# Patient Record
Sex: MALE | Race: WHITE | NOT HISPANIC OR LATINO | Employment: PART TIME | RURAL
[De-identification: names, ages, dates, MRNs, and addresses within clinical notes are randomized per-mention and may not be internally consistent; named-entity substitution may affect disease eponyms.]

---

## 2022-04-26 RX ORDER — TIZANIDINE 4 MG/1
TABLET ORAL
Qty: 90 TABLET | OUTPATIENT
Start: 2022-04-26

## 2022-04-26 RX ORDER — OMEPRAZOLE 20 MG/1
CAPSULE, DELAYED RELEASE ORAL
Qty: 90 CAPSULE | OUTPATIENT
Start: 2022-04-26

## 2022-04-26 RX ORDER — ESCITALOPRAM OXALATE 20 MG/1
TABLET ORAL
Qty: 90 TABLET | OUTPATIENT
Start: 2022-04-26

## 2022-04-26 NOTE — TELEPHONE ENCOUNTER
Called patient and lvm, we need to get him on the schedule. His last visit dedicated to his medications was on 03/17/21. This was a preventative exam so if he wanted to do a physical, he is due and can do that. Other than that, he has been seen 2x for acute back pain. Refusing medications and flagging it as contact provider first. Once we get him on the schedule with someone, we can send in his medication to hold him over to his appointment

## 2022-04-29 ENCOUNTER — TELEPHONE (OUTPATIENT)
Dept: FAMILY MEDICINE CLINIC | Facility: CLINIC | Age: 47
End: 2022-04-29

## 2022-04-29 RX ORDER — OMEPRAZOLE 20 MG/1
20 CAPSULE, DELAYED RELEASE ORAL
Qty: 30 CAPSULE | Refills: 0 | Status: SHIPPED | OUTPATIENT
Start: 2022-04-29 | End: 2022-07-15 | Stop reason: SDUPTHER

## 2022-04-29 RX ORDER — ESCITALOPRAM OXALATE 20 MG/1
20 TABLET ORAL DAILY
Qty: 30 TABLET | Refills: 0 | Status: SHIPPED | OUTPATIENT
Start: 2022-04-29 | End: 2022-07-15 | Stop reason: SDUPTHER

## 2022-04-29 RX ORDER — TIZANIDINE 4 MG/1
4 TABLET ORAL NIGHTLY PRN
COMMUNITY
End: 2022-04-29 | Stop reason: SDUPTHER

## 2022-04-29 RX ORDER — TIZANIDINE 4 MG/1
4 TABLET ORAL NIGHTLY
Qty: 30 TABLET | Refills: 0 | Status: SHIPPED | OUTPATIENT
Start: 2022-04-29 | End: 2022-07-27 | Stop reason: ALTCHOICE

## 2022-04-29 RX ORDER — ESCITALOPRAM OXALATE 20 MG/1
TABLET ORAL
Qty: 30 TABLET | Refills: 0 | Status: SHIPPED | OUTPATIENT
Start: 2022-04-29 | End: 2022-04-29 | Stop reason: SDUPTHER

## 2022-04-29 RX ORDER — OMEPRAZOLE 20 MG/1
CAPSULE, DELAYED RELEASE ORAL
Qty: 30 CAPSULE | Refills: 0 | Status: SHIPPED | OUTPATIENT
Start: 2022-04-29 | End: 2022-04-29 | Stop reason: SDUPTHER

## 2022-04-29 NOTE — TELEPHONE ENCOUNTER
Patient returned Jacque's call. He has been scheduled for 5/9/2022. He would like you to put the refills in to cover him until his appointment. He is out of medication.  See message below.

## 2022-05-17 ENCOUNTER — TELEPHONE (OUTPATIENT)
Dept: FAMILY MEDICINE CLINIC | Facility: CLINIC | Age: 47
End: 2022-05-17

## 2022-07-15 ENCOUNTER — TELEPHONE (OUTPATIENT)
Dept: FAMILY MEDICINE CLINIC | Facility: CLINIC | Age: 47
End: 2022-07-15

## 2022-07-15 RX ORDER — OMEPRAZOLE 20 MG/1
20 CAPSULE, DELAYED RELEASE ORAL
Qty: 30 CAPSULE | Refills: 0 | Status: SHIPPED | OUTPATIENT
Start: 2022-07-15 | End: 2022-07-27 | Stop reason: SDUPTHER

## 2022-07-15 RX ORDER — ESCITALOPRAM OXALATE 20 MG/1
20 TABLET ORAL DAILY
Qty: 30 TABLET | Refills: 0 | Status: SHIPPED | OUTPATIENT
Start: 2022-07-15 | End: 2022-07-27 | Stop reason: ALTCHOICE

## 2022-07-15 RX ORDER — ESCITALOPRAM OXALATE 20 MG/1
TABLET ORAL
Qty: 30 TABLET | Refills: 0 | OUTPATIENT
Start: 2022-07-15

## 2022-07-15 NOTE — TELEPHONE ENCOUNTER
Pt has been told he needs appt for refills and has no showed/canceled last appts. Will not send medicine in until seen in office.

## 2022-07-15 NOTE — TELEPHONE ENCOUNTER
Caller: Toribio Fishman    Relationship: Self    Best call back number: [phone]    Requested Prescriptions:   Requested Prescriptions     Pending Prescriptions Disp Refills   • omeprazole (priLOSEC) 20 MG capsule 30 capsule 0     Sig: Take 1 capsule by mouth Every Morning Before Breakfast.   • escitalopram (LEXAPRO) 20 MG tablet 30 tablet 0     Sig: Take 1 tablet by mouth Daily.        Pharmacy where request should be sent: [facility]  - [phone] [facility] [phone] [facility]     Additional details provided by patient: PATIENT HAS 1 DAY SUPPLY LEFT     Does the patient have less than a 3 day supply:  [x] Yes  [] No    Bernabe Butcher Rep   07/15/22 15:29 EDT

## 2022-07-27 ENCOUNTER — OFFICE VISIT (OUTPATIENT)
Dept: FAMILY MEDICINE CLINIC | Facility: CLINIC | Age: 47
End: 2022-07-27

## 2022-07-27 VITALS
TEMPERATURE: 96.7 F | OXYGEN SATURATION: 97 % | DIASTOLIC BLOOD PRESSURE: 100 MMHG | BODY MASS INDEX: 32.78 KG/M2 | HEIGHT: 72 IN | HEART RATE: 73 BPM | WEIGHT: 242 LBS | SYSTOLIC BLOOD PRESSURE: 130 MMHG

## 2022-07-27 DIAGNOSIS — Z13.29 THYROID DISORDER SCREENING: ICD-10-CM

## 2022-07-27 DIAGNOSIS — K21.9 GASTROESOPHAGEAL REFLUX DISEASE, UNSPECIFIED WHETHER ESOPHAGITIS PRESENT: Primary | ICD-10-CM

## 2022-07-27 DIAGNOSIS — R03.0 ELEVATED BLOOD PRESSURE READING WITHOUT DIAGNOSIS OF HYPERTENSION: ICD-10-CM

## 2022-07-27 DIAGNOSIS — F41.9 ANXIETY: ICD-10-CM

## 2022-07-27 DIAGNOSIS — Z13.220 LIPID SCREENING: ICD-10-CM

## 2022-07-27 DIAGNOSIS — K59.00 CONSTIPATION, UNSPECIFIED CONSTIPATION TYPE: ICD-10-CM

## 2022-07-27 DIAGNOSIS — E55.9 VITAMIN D DEFICIENCY: ICD-10-CM

## 2022-07-27 DIAGNOSIS — G89.29 CHRONIC BILATERAL LOW BACK PAIN WITHOUT SCIATICA: ICD-10-CM

## 2022-07-27 DIAGNOSIS — M54.50 CHRONIC BILATERAL LOW BACK PAIN WITHOUT SCIATICA: ICD-10-CM

## 2022-07-27 DIAGNOSIS — E53.8 LOW VITAMIN B12 LEVEL: ICD-10-CM

## 2022-07-27 PROCEDURE — 99214 OFFICE O/P EST MOD 30 MIN: CPT | Performed by: PHYSICIAN ASSISTANT

## 2022-07-27 RX ORDER — OMEPRAZOLE 20 MG/1
20 CAPSULE, DELAYED RELEASE ORAL
Qty: 90 CAPSULE | Refills: 1 | Status: SHIPPED | OUTPATIENT
Start: 2022-07-27 | End: 2023-03-06 | Stop reason: SDUPTHER

## 2022-07-27 RX ORDER — METHOCARBAMOL 500 MG/1
TABLET, FILM COATED ORAL
Qty: 30 TABLET | Refills: 0 | Status: SHIPPED | OUTPATIENT
Start: 2022-07-27 | End: 2022-09-21 | Stop reason: SDUPTHER

## 2022-07-27 RX ORDER — PAROXETINE HYDROCHLORIDE 20 MG/1
20 TABLET, FILM COATED ORAL EVERY MORNING
Qty: 30 TABLET | Refills: 0 | Status: SHIPPED | OUTPATIENT
Start: 2022-07-27 | End: 2022-09-21 | Stop reason: SDUPTHER

## 2022-07-27 NOTE — PROGRESS NOTES
"Chief Complaint  Med Refill (Medication refills ) and Constipation (Not having normal bowel movements wants to discuss getting a colonoscopy )    Subjective          Toribio Fishman presents to [facility]  Patient in today for medication recheck and refills. He would like to go back to [facility] for his anxiety- felt it worked better than the lexapro. Denies any SI/HI.  He states omeprazole continues to work for his reflux symptoms. He also states takes muscle relaxer prn at bedtime- has had some issues with lower back in past that have shown improvement.  He states has noticed more issues with constipation so interested in getting in with GI for consult. States his bp has been good when he has checked- but has not checked lately. Denies chest pain or shortness of breath.       Objective   Vital Signs:   /100 (BP Location: Left arm, Patient Position: Sitting, Cuff Size: Large Adult)   Pulse 73   Temp 96.7 °F (35.9 °C)   Ht 182.9 cm (72\")   Wt 110 kg (242 lb)   SpO2 97%   BMI 32.82 kg/m²     Body mass index is 32.82 kg/m².    Review of Systems    Past History:  Medical History: has a past medical history of Cardiomyopathy (HCC), Intermittent palpitations, and Obsessive compulsive disorder.   Surgical History: has a past surgical history that includes Cardiac catheterization (11/2005).   Family History: family history includes Coronary artery disease in an other family member; Diabetes in an other family member; Hypertension in his father.   Social History: reports that he has never smoked. He has never used smokeless tobacco. He reports current alcohol use of about 12.0 standard drinks of alcohol per week. He reports that he does not use drugs.      Current Outpatient Medications:   •  omeprazole (priLOSEC) 20 MG capsule, Take 1 capsule by mouth Every Morning Before Breakfast., Disp: 90 capsule, Rfl: 1  •  methocarbamol (Robaxin) 500 MG tablet, Take one pill at bedtime, as " needed, Disp: 30 tablet, Rfl: 0  •  PARoxetine (Paxil) 20 MG tablet, Take 1 tablet by mouth Every Morning., Disp: 30 tablet, Rfl: 0  Allergies: Patient has no known allergies.    Physical Exam  Constitutional:       Appearance: Normal appearance.   HENT:      Right Ear: Tympanic membrane normal.      Left Ear: Tympanic membrane normal.      Mouth/Throat:      Pharynx: Oropharynx is clear.   Eyes:      Conjunctiva/sclera: Conjunctivae normal.      Pupils: Pupils are equal, round, and reactive to light.   Cardiovascular:      Rate and Rhythm: Normal rate and regular rhythm.      Heart sounds: Normal heart sounds.   Pulmonary:      Effort: Pulmonary effort is normal.      Breath sounds: Normal breath sounds.   Abdominal:      Palpations: Abdomen is soft.      Tenderness: There is no abdominal tenderness.   Neurological:      Mental Status: He is oriented to person, place, and time.   Psychiatric:         Mood and Affect: Mood normal.         Behavior: Behavior normal.             Assessment and Plan   Diagnoses and all orders for this visit:    1. Gastroesophageal reflux disease, unspecified whether esophagitis present (Primary)  -     CBC & Differential; Future  -     CBC & Differential  Refilled omeprazole.   2. Anxiety  He is going to wean down off the lexapro over next few weeks and then will  switch over to the paxil. Will recheck in office after on paxil x 3 weeks, prior as needed.   3. Chronic bilateral low back pain without sciatica  Muscle relaxer to use at bedtime, as needed. Discussed possible side effects.   4. Low vitamin B12 level  -     Vitamin B12; Future  -     Vitamin B12  Will recheck b12 level.   5. Vitamin D deficiency  -     Vitamin D 25 Hydroxy; Future  -     Vitamin D 25 Hydroxy  Will recheck vit d with labs.   6. Lipid screening  -     Comprehensive Metabolic Panel; Future  -     Lipid Panel; Future  -     Comprehensive Metabolic Panel  -     Lipid Panel    7. Thyroid disorder screening  -      TSH; Future  -     TSH  TSH with labs.   8. Constipation, unspecified constipation type  -     Ambulatory Referral to Gastroenterology  Will set up with GI. Encouraged healthy diet and good hydration.   9. Elevated blood pressure reading without diagnosis of hypertension  Patient to monitor bp over next 2 weeks and bring in bp readings, prior as needed if staying elevated.   Other orders  -     PARoxetine (Paxil) 20 MG tablet; Take 1 tablet by mouth Every Morning.  Dispense: 30 tablet; Refill: 0  -     methocarbamol (Robaxin) 500 MG tablet; Take one pill at bedtime, as needed  Dispense: 30 tablet; Refill: 0  -     omeprazole (priLOSEC) 20 MG capsule; Take 1 capsule by mouth Every Morning Before Breakfast.  Dispense: 90 capsule; Refill: 1            Follow Up   No follow-ups on file.  Patient was given instructions and counseling regarding his condition or for health maintenance advice. Please see specific information pulled into the AVS if appropriate.     Rohini Guzman PA-C

## 2022-07-28 LAB
25(OH)D3+25(OH)D2 SERPL-MCNC: 31.3 NG/ML (ref 30–100)
ALBUMIN SERPL-MCNC: 4.3 G/DL (ref 4–5)
ALBUMIN/GLOB SERPL: 1.5 {RATIO} (ref 1.2–2.2)
ALP SERPL-CCNC: 60 IU/L (ref 44–121)
ALT SERPL-CCNC: 25 IU/L (ref 0–44)
AST SERPL-CCNC: 19 IU/L (ref 0–40)
BASOPHILS # BLD AUTO: 0 X10E3/UL (ref 0–0.2)
BASOPHILS NFR BLD AUTO: 1 %
BILIRUB SERPL-MCNC: 0.4 MG/DL (ref 0–1.2)
BUN SERPL-MCNC: 13 MG/DL (ref 6–24)
BUN/CREAT SERPL: 13 (ref 9–20)
CALCIUM SERPL-MCNC: 9.8 MG/DL (ref 8.7–10.2)
CHLORIDE SERPL-SCNC: 103 MMOL/L (ref 96–106)
CHOLEST SERPL-MCNC: 226 MG/DL (ref 100–199)
CO2 SERPL-SCNC: 25 MMOL/L (ref 20–29)
CREAT SERPL-MCNC: 1.03 MG/DL (ref 0.76–1.27)
EGFRCR SERPLBLD CKD-EPI 2021: 91 ML/MIN/1.73
EOSINOPHIL # BLD AUTO: 0.1 X10E3/UL (ref 0–0.4)
EOSINOPHIL NFR BLD AUTO: 3 %
ERYTHROCYTE [DISTWIDTH] IN BLOOD BY AUTOMATED COUNT: 13.5 % (ref 11.6–15.4)
GLOBULIN SER CALC-MCNC: 2.8 G/DL (ref 1.5–4.5)
GLUCOSE SERPL-MCNC: 98 MG/DL (ref 65–99)
HCT VFR BLD AUTO: 43.4 % (ref 37.5–51)
HDLC SERPL-MCNC: 64 MG/DL
HGB BLD-MCNC: 14.9 G/DL (ref 13–17.7)
IMM GRANULOCYTES # BLD AUTO: 0 X10E3/UL (ref 0–0.1)
IMM GRANULOCYTES NFR BLD AUTO: 1 %
LDLC SERPL CALC-MCNC: 121 MG/DL (ref 0–99)
LYMPHOCYTES # BLD AUTO: 0.9 X10E3/UL (ref 0.7–3.1)
LYMPHOCYTES NFR BLD AUTO: 22 %
MCH RBC QN AUTO: 32.4 PG (ref 26.6–33)
MCHC RBC AUTO-ENTMCNC: 34.3 G/DL (ref 31.5–35.7)
MCV RBC AUTO: 94 FL (ref 79–97)
MONOCYTES # BLD AUTO: 0.4 X10E3/UL (ref 0.1–0.9)
MONOCYTES NFR BLD AUTO: 9 %
NEUTROPHILS # BLD AUTO: 2.7 X10E3/UL (ref 1.4–7)
NEUTROPHILS NFR BLD AUTO: 64 %
PLATELET # BLD AUTO: 266 X10E3/UL (ref 150–450)
POTASSIUM SERPL-SCNC: 4.7 MMOL/L (ref 3.5–5.2)
PROT SERPL-MCNC: 7.1 G/DL (ref 6–8.5)
RBC # BLD AUTO: 4.6 X10E6/UL (ref 4.14–5.8)
SODIUM SERPL-SCNC: 143 MMOL/L (ref 134–144)
TRIGL SERPL-MCNC: 235 MG/DL (ref 0–149)
TSH SERPL DL<=0.005 MIU/L-ACNC: 1.76 UIU/ML (ref 0.45–4.5)
VIT B12 SERPL-MCNC: 293 PG/ML (ref 232–1245)
VLDLC SERPL CALC-MCNC: 41 MG/DL (ref 5–40)
WBC # BLD AUTO: 4.2 X10E3/UL (ref 3.4–10.8)

## 2022-07-30 DIAGNOSIS — E78.2 MIXED HYPERLIPIDEMIA: Primary | ICD-10-CM

## 2022-07-30 DIAGNOSIS — E53.8 LOW VITAMIN B12 LEVEL: ICD-10-CM

## 2022-08-01 ENCOUNTER — PATIENT MESSAGE (OUTPATIENT)
Dept: FAMILY MEDICINE CLINIC | Facility: CLINIC | Age: 47
End: 2022-08-01

## 2022-08-03 ENCOUNTER — TELEPHONE (OUTPATIENT)
Dept: FAMILY MEDICINE CLINIC | Facility: CLINIC | Age: 47
End: 2022-08-03

## 2022-08-15 ENCOUNTER — PATIENT MESSAGE (OUTPATIENT)
Dept: FAMILY MEDICINE CLINIC | Facility: CLINIC | Age: 47
End: 2022-08-15

## 2022-08-17 NOTE — TELEPHONE ENCOUNTER
DAYAMI called me back and was able to get patient an appointment for the morning. Tried calling patient, will try again later

## 2022-08-17 NOTE — TELEPHONE ENCOUNTER
From: Toribio Fishman  To: Rohini Guzman PA-C  Sent: 8/15/2022 5:10 PM EDT  Subject: Colonoscopy    I still haven’t received a call regarding a Colonoscopy exam. It’s been three weeks and nobody has got in touch with me. I’m still having stomach pains and wiping blood after going. We talked about seeing Dr Apple with [facility] but Im ready to see anyone as soon as possible. Thank you.

## 2022-09-21 RX ORDER — METHOCARBAMOL 500 MG/1
TABLET, FILM COATED ORAL
Qty: 30 TABLET | Refills: 0 | Status: SHIPPED | OUTPATIENT
Start: 2022-09-21

## 2022-09-21 RX ORDER — PAROXETINE HYDROCHLORIDE 20 MG/1
20 TABLET, FILM COATED ORAL EVERY MORNING
Qty: 30 TABLET | Refills: 0 | Status: SHIPPED | OUTPATIENT
Start: 2022-09-21 | End: 2022-10-27 | Stop reason: DRUGHIGH

## 2022-10-27 ENCOUNTER — OFFICE VISIT (OUTPATIENT)
Dept: FAMILY MEDICINE CLINIC | Facility: CLINIC | Age: 47
End: 2022-10-27

## 2022-10-27 VITALS
WEIGHT: 245 LBS | OXYGEN SATURATION: 98 % | HEART RATE: 73 BPM | SYSTOLIC BLOOD PRESSURE: 128 MMHG | HEIGHT: 72 IN | BODY MASS INDEX: 33.18 KG/M2 | DIASTOLIC BLOOD PRESSURE: 68 MMHG

## 2022-10-27 DIAGNOSIS — F41.9 ANXIETY: ICD-10-CM

## 2022-10-27 DIAGNOSIS — Z00.00 ROUTINE PHYSICAL EXAMINATION: Primary | ICD-10-CM

## 2022-10-27 DIAGNOSIS — E78.2 MIXED HYPERLIPIDEMIA: ICD-10-CM

## 2022-10-27 DIAGNOSIS — K21.9 GASTROESOPHAGEAL REFLUX DISEASE, UNSPECIFIED WHETHER ESOPHAGITIS PRESENT: ICD-10-CM

## 2022-10-27 PROCEDURE — 99396 PREV VISIT EST AGE 40-64: CPT | Performed by: PHYSICIAN ASSISTANT

## 2022-10-27 PROCEDURE — 36415 COLL VENOUS BLD VENIPUNCTURE: CPT | Performed by: PHYSICIAN ASSISTANT

## 2022-10-27 RX ORDER — PAROXETINE 30 MG/1
TABLET, FILM COATED ORAL
Qty: 90 TABLET | Refills: 0 | Status: SHIPPED | OUTPATIENT
Start: 2022-10-27 | End: 2023-02-15

## 2022-10-28 LAB
ALBUMIN SERPL-MCNC: 4.4 G/DL (ref 4–5)
ALBUMIN/GLOB SERPL: 1.4 {RATIO} (ref 1.2–2.2)
ALP SERPL-CCNC: 68 IU/L (ref 44–121)
ALT SERPL-CCNC: 25 IU/L (ref 0–44)
AST SERPL-CCNC: 19 IU/L (ref 0–40)
BILIRUB SERPL-MCNC: 0.3 MG/DL (ref 0–1.2)
BUN SERPL-MCNC: 15 MG/DL (ref 6–24)
BUN/CREAT SERPL: 13 (ref 9–20)
CALCIUM SERPL-MCNC: 9.6 MG/DL (ref 8.7–10.2)
CHLORIDE SERPL-SCNC: 104 MMOL/L (ref 96–106)
CHOLEST SERPL-MCNC: 208 MG/DL (ref 100–199)
CO2 SERPL-SCNC: 25 MMOL/L (ref 20–29)
CREAT SERPL-MCNC: 1.12 MG/DL (ref 0.76–1.27)
EGFRCR SERPLBLD CKD-EPI 2021: 83 ML/MIN/1.73
GLOBULIN SER CALC-MCNC: 3.1 G/DL (ref 1.5–4.5)
GLUCOSE SERPL-MCNC: 105 MG/DL (ref 70–99)
HDLC SERPL-MCNC: 64 MG/DL
LDLC SERPL CALC-MCNC: 117 MG/DL (ref 0–99)
POTASSIUM SERPL-SCNC: 4.7 MMOL/L (ref 3.5–5.2)
PROT SERPL-MCNC: 7.5 G/DL (ref 6–8.5)
SODIUM SERPL-SCNC: 141 MMOL/L (ref 134–144)
TRIGL SERPL-MCNC: 155 MG/DL (ref 0–149)
VIT B12 SERPL-MCNC: 251 PG/ML (ref 232–1245)
VLDLC SERPL CALC-MCNC: 27 MG/DL (ref 5–40)

## 2022-10-28 NOTE — PROGRESS NOTES
"Chief Complaint  Annual Exam (Med refills and BW )    Subjective          Toribio Fishman presents to [facility]  History of Present Illness  Patient in today for routine physical along with medication recheck and refills.  He states the Paxil seems to be working for his anxiety but he is interested in possibly increasing it just a bit ast still having some symptoms.  He denies any depression symptoms. Denies SI/HI.  States omeprazole continues to work well for reflux symptoms. He is here fasting for labs.   Anxiety  Presents for follow-up visit. Symptoms include nervous/anxious behavior. Patient reports no chest pain, depressed mood, dizziness or nausea.       Heartburn  He complains of heartburn. He reports no abdominal pain, no chest pain, no hoarse voice, no nausea, no sore throat or no wheezing. Pertinent negatives include no fatigue.       Objective   Vital Signs:   /68 (BP Location: Left arm, Patient Position: Sitting, Cuff Size: Large Adult)   Pulse 73   Ht 182.9 cm (72\")   Wt 111 kg (245 lb)   SpO2 98%   BMI 33.23 kg/m²     Body mass index is 33.23 kg/m².    Review of Systems   Constitutional: Negative for fatigue.   HENT: Negative for congestion, hoarse voice and sore throat.    Respiratory: Negative for wheezing.    Cardiovascular: Negative for chest pain.   Gastrointestinal: Positive for GERD. Negative for abdominal pain, diarrhea, nausea and vomiting.   Neurological: Negative for dizziness and headache.   Psychiatric/Behavioral: Negative for depressed mood. The patient is nervous/anxious.        Past History:  Medical History: has a past medical history of Cardiomyopathy (HCC), Intermittent palpitations, and Obsessive compulsive disorder.   Surgical History: has a past surgical history that includes Cardiac catheterization (11/2005).   Family History: family history includes Coronary artery disease in an other family member; Diabetes in an other family member; " Hypertension in his father.   Social History: reports that he has never smoked. He has never used smokeless tobacco. He reports current alcohol use of about 12.0 standard drinks per week. He reports that he does not use drugs.      Current Outpatient Medications:   •  methocarbamol (Robaxin) 500 MG tablet, Take one pill at bedtime, as needed, Disp: 30 tablet, Rfl: 0  •  omeprazole (priLOSEC) 20 MG capsule, Take 1 capsule by mouth Every Morning Before Breakfast., Disp: 90 capsule, Rfl: 1  •  PARoxetine (Paxil) 30 MG tablet, Take one tablet by mouth  once a day., Disp: 90 tablet, Rfl: 0  Allergies: Patient has no known allergies.    Physical Exam  Constitutional:       Appearance: Normal appearance.   HENT:      Right Ear: Tympanic membrane normal.      Left Ear: Tympanic membrane normal.      Mouth/Throat:      Pharynx: Oropharynx is clear.   Eyes:      Conjunctiva/sclera: Conjunctivae normal.      Pupils: Pupils are equal, round, and reactive to light.   Cardiovascular:      Rate and Rhythm: Normal rate and regular rhythm.      Heart sounds: Normal heart sounds.   Pulmonary:      Effort: Pulmonary effort is normal.      Breath sounds: Normal breath sounds.   Abdominal:      Palpations: Abdomen is soft.      Tenderness: There is no abdominal tenderness.   Neurological:      Mental Status: He is oriented to person, place, and time.   Psychiatric:         Mood and Affect: Mood normal.         Behavior: Behavior normal.             Assessment and Plan   Diagnoses and all orders for this visit:    1. Routine physical examination (Primary)  Encouraged healthy diet and exercise.  2. Mixed hyperlipidemia  -     Lipid Panel  -     Comprehensive Metabolic Panel  We will check fasting lipid panel.  3. Gastroesophageal reflux disease, unspecified whether esophagitis present  Continue on omeprazole as is helping his symptoms at this time.  4. Anxiety  Will increase the dosage of Paxil to 30 mg once a day and recheck in 6 weeks,  prior as needed.  Other orders  -     Vitamin B12  -     PARoxetine (Paxil) 30 MG tablet; Take one tablet by mouth  once a day.  Dispense: 90 tablet; Refill: 0            Follow Up   No follow-ups on file.  Patient was given instructions and counseling regarding his condition or for health maintenance advice. Please see specific information pulled into the AVS if appropriate.     Rohini Guzman PA-C

## 2022-10-31 ENCOUNTER — TELEPHONE (OUTPATIENT)
Dept: FAMILY MEDICINE CLINIC | Facility: CLINIC | Age: 47
End: 2022-10-31

## 2022-10-31 DIAGNOSIS — E53.8 LOW SERUM VITAMIN B12: ICD-10-CM

## 2022-10-31 DIAGNOSIS — E78.2 MIXED HYPERLIPIDEMIA: Primary | ICD-10-CM

## 2022-10-31 NOTE — TELEPHONE ENCOUNTER
Caller: Toribio Fishman    Relationship to patient: Self    Best call back number: [phone]    Patient is needing: PATIENT RECEIVED A CALL ABOUT TEST RESULTS AND IS CALLING BACK TO FOLLOW UP.

## 2022-11-11 ENCOUNTER — TELEPHONE (OUTPATIENT)
Dept: FAMILY MEDICINE CLINIC | Facility: CLINIC | Age: 47
End: 2022-11-11

## 2022-11-11 NOTE — TELEPHONE ENCOUNTER
Caller: JOSE MANUEL ANTUNEZ BILLING    Relationship: Other    Best call back number: [phone]    THEY NEED THE DIAGNOSIS CODE FOR THE VITAMIN B12. PLEASE CALL AND ADVISE

## 2023-02-15 RX ORDER — PAROXETINE 30 MG/1
TABLET, FILM COATED ORAL
Qty: 30 TABLET | Refills: 0 | Status: SHIPPED | OUTPATIENT
Start: 2023-02-15 | End: 2023-03-06 | Stop reason: SDUPTHER

## 2023-03-06 ENCOUNTER — OFFICE VISIT (OUTPATIENT)
Dept: FAMILY MEDICINE CLINIC | Facility: CLINIC | Age: 48
End: 2023-03-06
Payer: COMMERCIAL

## 2023-03-06 VITALS
BODY MASS INDEX: 33.86 KG/M2 | DIASTOLIC BLOOD PRESSURE: 108 MMHG | WEIGHT: 250 LBS | HEIGHT: 72 IN | SYSTOLIC BLOOD PRESSURE: 140 MMHG | OXYGEN SATURATION: 99 % | HEART RATE: 88 BPM

## 2023-03-06 DIAGNOSIS — J30.89 NON-SEASONAL ALLERGIC RHINITIS DUE TO OTHER ALLERGIC TRIGGER: ICD-10-CM

## 2023-03-06 DIAGNOSIS — K62.5 BRBPR (BRIGHT RED BLOOD PER RECTUM): Primary | ICD-10-CM

## 2023-03-06 DIAGNOSIS — K21.00 GASTROESOPHAGEAL REFLUX DISEASE WITH ESOPHAGITIS WITHOUT HEMORRHAGE: ICD-10-CM

## 2023-03-06 DIAGNOSIS — F33.41 RECURRENT MAJOR DEPRESSIVE DISORDER, IN PARTIAL REMISSION: ICD-10-CM

## 2023-03-06 DIAGNOSIS — G89.29 CHRONIC LOW BACK PAIN WITHOUT SCIATICA, UNSPECIFIED BACK PAIN LATERALITY: ICD-10-CM

## 2023-03-06 DIAGNOSIS — M54.50 CHRONIC LOW BACK PAIN WITHOUT SCIATICA, UNSPECIFIED BACK PAIN LATERALITY: ICD-10-CM

## 2023-03-06 DIAGNOSIS — N52.9 ERECTILE DYSFUNCTION, UNSPECIFIED ERECTILE DYSFUNCTION TYPE: ICD-10-CM

## 2023-03-06 PROCEDURE — 99214 OFFICE O/P EST MOD 30 MIN: CPT | Performed by: FAMILY MEDICINE

## 2023-03-06 RX ORDER — TADALAFIL 10 MG/1
10 TABLET ORAL DAILY PRN
Qty: 30 TABLET | Refills: 3 | Status: SHIPPED | OUTPATIENT
Start: 2023-03-06

## 2023-03-06 RX ORDER — TIZANIDINE HYDROCHLORIDE 4 MG/1
4 CAPSULE, GELATIN COATED ORAL 3 TIMES DAILY
Qty: 30 CAPSULE | Refills: 5 | Status: SHIPPED | OUTPATIENT
Start: 2023-03-06

## 2023-03-06 RX ORDER — PAROXETINE 30 MG/1
TABLET, FILM COATED ORAL
Qty: 30 TABLET | Refills: 0 | Status: SHIPPED | OUTPATIENT
Start: 2023-03-06

## 2023-03-06 RX ORDER — MOMETASONE FUROATE 50 UG/1
2 SPRAY, METERED NASAL DAILY
Qty: 3 EACH | Refills: 3 | Status: SHIPPED | OUTPATIENT
Start: 2023-03-06

## 2023-03-06 RX ORDER — OMEPRAZOLE 20 MG/1
20 CAPSULE, DELAYED RELEASE ORAL
Qty: 90 CAPSULE | Refills: 1 | Status: SHIPPED | OUTPATIENT
Start: 2023-03-06

## 2023-03-06 NOTE — PROGRESS NOTES
Office Note     Name: Toribio Fishman    : 1975     MRN: [id number]     Chief Complaint  Abdominal Pain and Rectal Bleeding    Subjective     History of Present Illness:  Toribio Fishman is a 47 y.o. male who presents today for rectal bleeding and abdominal pain.  Has appointment with Dr. Apple.  Its bright red blood per rectum explained to him only internal hemorrhoids and external hemorrhoids diverticulosis and colitis really he hurts right of the bellybutton.  Having nothing to do with omeprazole  He has Cialis and Viagra because nasal stuffiness, quit using them, but would like to try them again based on allergic rhinitis being treated Flonase or Nasonex.  He takes Zyrtec when needed.  He asked that I refill his tizanidine 4 mg 3 times daily.    Need to refill his Paxil 30 mg to prevent anxiety I really rechecked his pressure and got 150/90 left arm    Review of Systems:   Review of Systems    Past Medical History:   Past Medical History:   Diagnosis Date   • Cardiomyopathy (HCC)     MILD      • Intermittent palpitations    • Obsessive compulsive disorder        Past Surgical History:   Past Surgical History:   Procedure Laterality Date   • CARDIAC CATHETERIZATION  2005       Family History:   Family History   Problem Relation Age of Onset   • Hypertension Father    • Coronary artery disease Other    • Diabetes Other        Social History:   Social History     Socioeconomic History   • Marital status:    Tobacco Use   • Smoking status: Never   • Smokeless tobacco: Never   Vaping Use   • Vaping Use: Never used   Substance and Sexual Activity   • Alcohol use: Yes     Alcohol/week: 12.0 standard drinks     Types: 12 Cans of beer per week   • Drug use: Never   • Sexual activity: Defer       Immunizations:   Immunization History   Administered Date(s) Administered   • COVID-19 (PFIZER) PURPLE CAP 2021, 2021   • Influenza, Unspecified 2019        Medications:     Current  "Outpatient Medications:   •  omeprazole (priLOSEC) 20 MG capsule, Take 1 capsule by mouth Every Morning Before Breakfast., Disp: 90 capsule, Rfl: 1  •  PARoxetine (PAXIL) 30 MG tablet, TAKE ONE TABLET BY MOUTH DAILY, Disp: 30 tablet, Rfl: 0  •  methocarbamol (Robaxin) 500 MG tablet, Take one pill at bedtime, as needed, Disp: 30 tablet, Rfl: 0  •  mometasone (Nasonex) 50 MCG/ACT nasal spray, 2 sprays into the nostril(s) as directed by provider Daily., Disp: 3 each, Rfl: 3  •  tadalafil (Cialis) 10 MG tablet, Take 1 tablet by mouth Daily As Needed for Erectile Dysfunction., Disp: 30 tablet, Rfl: 3  •  TiZANidine (ZANAFLEX) 4 MG capsule, Take 1 capsule by mouth 3 (Three) Times a Day., Disp: 30 capsule, Rfl: 5    Allergies:   No Known Allergies    Objective     Vital Signs  BP (!) 140/108 (BP Location: Left arm, Patient Position: Sitting, Cuff Size: Large Adult)   Pulse 88   Ht 182.9 cm (72\")   Wt 113 kg (250 lb)   SpO2 99%   BMI 33.91 kg/m²   Estimated body mass index is 33.91 kg/m² as calculated from the following:    Height as of this encounter: 182.9 cm (72\").    Weight as of this encounter: 113 kg (250 lb).          Physical Exam  Vitals and nursing note reviewed.   Constitutional:       Appearance: Normal appearance. He is obese.   HENT:      Head: Normocephalic.      Right Ear: External ear normal.      Left Ear: External ear normal.      Nose: Nose normal.   Eyes:      Pupils: Pupils are equal, round, and reactive to light.   Neck:      Vascular: No carotid bruit.   Cardiovascular:      Rate and Rhythm: Normal rate and regular rhythm.      Pulses: Normal pulses.      Heart sounds: Normal heart sounds.   Pulmonary:      Effort: Pulmonary effort is normal.      Breath sounds: Normal breath sounds.   Abdominal:          Comments: Where pain is.   Without rebound or guarding.   Musculoskeletal:      Cervical back: Normal range of motion and neck supple.   Skin:     General: Skin is warm and dry.   Neurological: "      Mental Status: He is alert.   Psychiatric:         Mood and Affect: Mood normal.          Procedures     Assessment and Plan     1. BRBPR (bright red blood per rectum)  To have a scope by Dr. Apple according to this getting better    2. Gastroesophageal reflux disease with esophagitis without hemorrhage  Controlled  - omeprazole (priLOSEC) 20 MG capsule; Take 1 capsule by mouth Every Morning Before Breakfast.  Dispense: 90 capsule; Refill: 1    3. Non-seasonal allergic rhinitis due to other allergic trigger  We will put on the spray  - mometasone (Nasonex) 50 MCG/ACT nasal spray; 2 sprays into the nostril(s) as directed by provider Daily.  Dispense: 3 each; Refill: 3    4. Recurrent major depressive disorder, in partial remission (HCC)  Controlled  - PARoxetine (PAXIL) 30 MG tablet; TAKE ONE TABLET BY MOUTH DAILY  Dispense: 30 tablet; Refill: 0    5. Erectile dysfunction, unspecified erectile dysfunction type  Will start 10 mg, #30, and refills 3  - tadalafil (Cialis) 10 MG tablet; Take 1 tablet by mouth Daily As Needed for Erectile Dysfunction.  Dispense: 30 tablet; Refill: 3    6. Chronic low back pain without sciatica, unspecified back pain laterality  To have his tizanidine 4 mg #30 with refills x5       Follow Up  No follow-ups on file.    Daniele GEE PC [facility]  [address] [place] [id number] [phone]

## 2023-05-10 DIAGNOSIS — F33.41 RECURRENT MAJOR DEPRESSIVE DISORDER, IN PARTIAL REMISSION: ICD-10-CM

## 2023-05-10 RX ORDER — PAROXETINE 30 MG/1
TABLET, FILM COATED ORAL
Qty: 30 TABLET | Refills: 0 | Status: SHIPPED | OUTPATIENT
Start: 2023-05-10

## 2023-06-05 DIAGNOSIS — F33.41 RECURRENT MAJOR DEPRESSIVE DISORDER, IN PARTIAL REMISSION: ICD-10-CM

## 2023-06-05 RX ORDER — PAROXETINE 30 MG/1
TABLET, FILM COATED ORAL
Qty: 30 TABLET | Refills: 0 | Status: SHIPPED | OUTPATIENT
Start: 2023-06-05

## 2023-08-01 DIAGNOSIS — K21.00 GASTROESOPHAGEAL REFLUX DISEASE WITH ESOPHAGITIS WITHOUT HEMORRHAGE: ICD-10-CM

## 2023-08-02 DIAGNOSIS — F33.41 RECURRENT MAJOR DEPRESSIVE DISORDER, IN PARTIAL REMISSION: ICD-10-CM

## 2023-08-03 RX ORDER — OMEPRAZOLE 20 MG/1
CAPSULE, DELAYED RELEASE ORAL
Qty: 90 CAPSULE | Refills: 1 | Status: SHIPPED | OUTPATIENT
Start: 2023-08-03

## 2023-08-03 RX ORDER — PAROXETINE 30 MG/1
TABLET, FILM COATED ORAL
Qty: 30 TABLET | Refills: 1 | Status: SHIPPED | OUTPATIENT
Start: 2023-08-03

## 2023-08-07 DIAGNOSIS — F33.41 RECURRENT MAJOR DEPRESSIVE DISORDER, IN PARTIAL REMISSION: ICD-10-CM

## 2023-08-07 DIAGNOSIS — K21.00 GASTROESOPHAGEAL REFLUX DISEASE WITH ESOPHAGITIS WITHOUT HEMORRHAGE: ICD-10-CM

## 2023-08-08 RX ORDER — PAROXETINE 30 MG/1
TABLET, FILM COATED ORAL
Qty: 30 TABLET | Refills: 1 | OUTPATIENT
Start: 2023-08-08

## 2023-08-08 RX ORDER — OMEPRAZOLE 20 MG/1
CAPSULE, DELAYED RELEASE ORAL
Qty: 90 CAPSULE | Refills: 1 | OUTPATIENT
Start: 2023-08-08

## 2023-09-30 RX ORDER — TIZANIDINE HYDROCHLORIDE 4 MG/1
CAPSULE, GELATIN COATED ORAL
Qty: 30 CAPSULE | Refills: 3 | Status: SHIPPED | OUTPATIENT
Start: 2023-09-30

## 2023-10-04 ENCOUNTER — OFFICE VISIT (OUTPATIENT)
Dept: FAMILY MEDICINE CLINIC | Facility: CLINIC | Age: 48
End: 2023-10-04
Payer: COMMERCIAL

## 2023-10-04 VITALS
SYSTOLIC BLOOD PRESSURE: 120 MMHG | WEIGHT: 250 LBS | HEART RATE: 79 BPM | HEIGHT: 72 IN | BODY MASS INDEX: 33.86 KG/M2 | OXYGEN SATURATION: 99 % | DIASTOLIC BLOOD PRESSURE: 84 MMHG

## 2023-10-04 DIAGNOSIS — F33.41 RECURRENT MAJOR DEPRESSIVE DISORDER, IN PARTIAL REMISSION: ICD-10-CM

## 2023-10-04 DIAGNOSIS — K21.00 GASTROESOPHAGEAL REFLUX DISEASE WITH ESOPHAGITIS WITHOUT HEMORRHAGE: ICD-10-CM

## 2023-10-04 DIAGNOSIS — N52.9 ERECTILE DYSFUNCTION, UNSPECIFIED ERECTILE DYSFUNCTION TYPE: ICD-10-CM

## 2023-10-04 DIAGNOSIS — K42.9 PERIUMBILICAL HERNIA: Primary | ICD-10-CM

## 2023-10-04 PROBLEM — F32.5 MAJOR DEPRESSIVE DISORDER WITH SINGLE EPISODE, IN FULL REMISSION: Status: ACTIVE | Noted: 2023-10-04

## 2023-10-04 PROCEDURE — 99214 OFFICE O/P EST MOD 30 MIN: CPT | Performed by: FAMILY MEDICINE

## 2023-10-04 RX ORDER — PAROXETINE 30 MG/1
30 TABLET, FILM COATED ORAL DAILY
Qty: 90 TABLET | Refills: 3 | Status: SHIPPED | OUTPATIENT
Start: 2023-10-04

## 2023-10-04 RX ORDER — OMEPRAZOLE 20 MG/1
40 CAPSULE, DELAYED RELEASE ORAL DAILY
Qty: 90 CAPSULE | Refills: 3 | Status: SHIPPED | OUTPATIENT
Start: 2023-10-04

## 2023-10-04 RX ORDER — TIZANIDINE HYDROCHLORIDE 4 MG/1
4 CAPSULE, GELATIN COATED ORAL 3 TIMES DAILY
Qty: 90 CAPSULE | Refills: 3 | Status: SHIPPED | OUTPATIENT
Start: 2023-10-04

## 2023-10-04 RX ORDER — TADALAFIL 10 MG/1
10 TABLET ORAL DAILY PRN
Qty: 30 TABLET | Refills: 5 | Status: SHIPPED | OUTPATIENT
Start: 2023-10-04

## 2023-10-04 NOTE — PROGRESS NOTES
Office Note     Name: Toribio Fishman    : 1975     MRN: [id number]     Chief Complaint  Mass (Around belly button. )    Subjective     History of Present Illness:  Toribio Fishman is a 48 y.o. 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  Mass around his bellybutton    Doing well with Paxil    Doing well with the omeprazole he 20 mg increased him to 40 mg ARN    Takes tizanidine sleep    Review of Systems:   Review of Systems    Past Medical History:   Past Medical History:   Diagnosis Date    Cardiomyopathy     MILD       Intermittent palpitations     Obsessive compulsive disorder        Past Surgical History:   Past Surgical History:   Procedure Laterality Date    CARDIAC CATHETERIZATION  2005       Family History:   Family History   Problem Relation Age of Onset    Hypertension Father     Coronary artery disease Other     Diabetes Other        Social History:   Social History     Socioeconomic History    Marital status:    Tobacco Use    Smoking status: Never    Smokeless tobacco: Never   Vaping Use    Vaping Use: Never used   Substance and Sexual Activity    Alcohol use: Yes     Alcohol/week: 12.0 standard drinks     Types: 12 Cans of beer per week    Drug use: Never    Sexual activity: Defer       Immunizations:   Immunization History   Administered Date(s) Administered    COVID-19 (PFIZER) Purple Cap Monovalent 2021, 2021    Influenza, Unspecified 2019        Medications:     Current Outpatient Medications:     mometasone (Nasonex) 50 MCG/ACT nasal spray, 2 sprays into the nostril(s) as directed by provider Daily., Disp: 3 each, Rfl: 3    omeprazole (priLOSEC) 20 MG capsule, Take 2 capsules by mouth Daily., Disp: 90 capsule, Rfl: 3    PARoxetine (PAXIL) 30 MG tablet, Take 1 tablet by mouth Daily., Disp: 90 tablet, Rfl: 3    tadalafil (Cialis) 10 MG tablet, Take 1 tablet by mouth Daily As Needed  "for Erectile Dysfunction., Disp: 30 tablet, Rfl: 5    TiZANidine (ZANAFLEX) 4 MG capsule, Take 1 capsule by mouth 3 (Three) Times a Day., Disp: 90 capsule, Rfl: 3    Allergies:   No Known Allergies    Objective     Vital Signs  /84   Pulse 79   Ht 182.9 cm (72.01\")   Wt 113 kg (250 lb)   SpO2 99%   BMI 33.90 kg/m²   Estimated body mass index is 33.9 kg/m² as calculated from the following:    Height as of this encounter: 182.9 cm (72.01\").    Weight as of this encounter: 113 kg (250 lb).          Physical Exam  Vitals and nursing note reviewed.   Constitutional:       Appearance: Normal appearance. He is obese.   HENT:      Head: Normocephalic.      Right Ear: External ear normal.      Left Ear: External ear normal.      Nose: Nose normal.   Eyes:      Pupils: Pupils are equal, round, and reactive to light.   Abdominal:          Comments: 3 inch mass standing up, he laid down it went away   Musculoskeletal:      Cervical back: Normal range of motion and neck supple.   Skin:     General: Skin is warm and dry.   Neurological:      Mental Status: He is alert.   Psychiatric:         Mood and Affect: Mood normal.         Behavior: Behavior normal.        Procedures     Assessment and Plan     1. Gastroesophageal reflux disease with esophagitis without hemorrhage  Controlled  - omeprazole (priLOSEC) 20 MG capsule; Take 2 capsules by mouth Daily.  Dispense: 90 capsule; Refill: 3    2. Recurrent major depressive disorder, in partial remission  Well-controlled  - PARoxetine (PAXIL) 30 MG tablet; Take 1 tablet by mouth Daily.  Dispense: 90 tablet; Refill: 3    3. Erectile dysfunction, unspecified erectile dysfunction type    - tadalafil (Cialis) 10 MG tablet; Take 1 tablet by mouth Daily As Needed for Erectile Dysfunction.  Dispense: 30 tablet; Refill: 5    4. Periumbilical hernia  Getting what Dr. Briones       Follow Up  Return in about 1 month (around 11/4/2023) for Labs Only.    Daniele Wu MD  [facility] " [facility]  [address]  [facility] [id number] [phone]

## 2023-10-20 DIAGNOSIS — F33.41 RECURRENT MAJOR DEPRESSIVE DISORDER, IN PARTIAL REMISSION: ICD-10-CM

## 2023-10-20 RX ORDER — PAROXETINE 30 MG/1
30 TABLET, FILM COATED ORAL DAILY
Qty: 30 TABLET | Refills: 2 | Status: SHIPPED | OUTPATIENT
Start: 2023-10-20

## 2023-10-28 DIAGNOSIS — F33.41 RECURRENT MAJOR DEPRESSIVE DISORDER, IN PARTIAL REMISSION: ICD-10-CM

## 2023-10-30 ENCOUNTER — TELEPHONE (OUTPATIENT)
Dept: FAMILY MEDICINE CLINIC | Facility: CLINIC | Age: 48
End: 2023-10-30

## 2023-10-30 DIAGNOSIS — Z00.00 ROUTINE PHYSICAL EXAMINATION: ICD-10-CM

## 2023-10-30 DIAGNOSIS — K62.5 BRBPR (BRIGHT RED BLOOD PER RECTUM): Primary | ICD-10-CM

## 2023-10-30 DIAGNOSIS — Z13.29 THYROID DISORDER SCREENING: ICD-10-CM

## 2023-10-30 RX ORDER — PAROXETINE 30 MG/1
30 TABLET, FILM COATED ORAL DAILY
Qty: 30 TABLET | Refills: 2 | OUTPATIENT
Start: 2023-10-30

## 2023-11-02 ENCOUNTER — LAB (OUTPATIENT)
Dept: FAMILY MEDICINE CLINIC | Facility: CLINIC | Age: 48
End: 2023-11-02
Payer: COMMERCIAL

## 2023-11-03 LAB
ALBUMIN SERPL-MCNC: 3.9 G/DL (ref 4.1–5.1)
ALBUMIN/GLOB SERPL: 1.4 {RATIO} (ref 1.2–2.2)
ALP SERPL-CCNC: 60 IU/L (ref 44–121)
ALT SERPL-CCNC: 31 IU/L (ref 0–44)
AST SERPL-CCNC: 43 IU/L (ref 0–40)
BASOPHILS # BLD AUTO: 0 X10E3/UL (ref 0–0.2)
BASOPHILS NFR BLD AUTO: 1 %
BILIRUB SERPL-MCNC: <0.2 MG/DL (ref 0–1.2)
BUN SERPL-MCNC: 20 MG/DL (ref 6–24)
BUN/CREAT SERPL: 21 (ref 9–20)
CALCIUM SERPL-MCNC: 9.4 MG/DL (ref 8.7–10.2)
CHLORIDE SERPL-SCNC: 105 MMOL/L (ref 96–106)
CHOLEST SERPL-MCNC: 228 MG/DL (ref 100–199)
CO2 SERPL-SCNC: 29 MMOL/L (ref 20–29)
CREAT SERPL-MCNC: 0.97 MG/DL (ref 0.76–1.27)
EGFRCR SERPLBLD CKD-EPI 2021: 98 ML/MIN/1.73
EOSINOPHIL # BLD AUTO: 0.1 X10E3/UL (ref 0–0.4)
EOSINOPHIL NFR BLD AUTO: 2 %
ERYTHROCYTE [DISTWIDTH] IN BLOOD BY AUTOMATED COUNT: 13.7 % (ref 11.6–15.4)
GLOBULIN SER CALC-MCNC: 2.8 G/DL (ref 1.5–4.5)
GLUCOSE SERPL-MCNC: 88 MG/DL (ref 70–99)
HCT VFR BLD AUTO: 39.7 % (ref 37.5–51)
HDLC SERPL-MCNC: 59 MG/DL
HGB BLD-MCNC: 13.3 G/DL (ref 13–17.7)
IMM GRANULOCYTES # BLD AUTO: 0 X10E3/UL (ref 0–0.1)
IMM GRANULOCYTES NFR BLD AUTO: 1 %
LDLC SERPL CALC-MCNC: 112 MG/DL (ref 0–99)
LYMPHOCYTES # BLD AUTO: 1.4 X10E3/UL (ref 0.7–3.1)
LYMPHOCYTES NFR BLD AUTO: 34 %
MCH RBC QN AUTO: 31.4 PG (ref 26.6–33)
MCHC RBC AUTO-ENTMCNC: 33.5 G/DL (ref 31.5–35.7)
MCV RBC AUTO: 94 FL (ref 79–97)
MONOCYTES # BLD AUTO: 0.4 X10E3/UL (ref 0.1–0.9)
MONOCYTES NFR BLD AUTO: 9 %
NEUTROPHILS # BLD AUTO: 2.2 X10E3/UL (ref 1.4–7)
NEUTROPHILS NFR BLD AUTO: 53 %
PLATELET # BLD AUTO: 280 X10E3/UL (ref 150–450)
POTASSIUM SERPL-SCNC: 4.6 MMOL/L (ref 3.5–5.2)
PROT SERPL-MCNC: 6.7 G/DL (ref 6–8.5)
RBC # BLD AUTO: 4.23 X10E6/UL (ref 4.14–5.8)
SODIUM SERPL-SCNC: 145 MMOL/L (ref 134–144)
TRIGL SERPL-MCNC: 335 MG/DL (ref 0–149)
TSH SERPL DL<=0.005 MIU/L-ACNC: 1.72 UIU/ML (ref 0.45–4.5)
VLDLC SERPL CALC-MCNC: 57 MG/DL (ref 5–40)
WBC # BLD AUTO: 4 X10E3/UL (ref 3.4–10.8)

## 2024-02-16 ENCOUNTER — PATIENT MESSAGE (OUTPATIENT)
Dept: FAMILY MEDICINE CLINIC | Facility: CLINIC | Age: 49
End: 2024-02-16
Payer: COMMERCIAL

## 2024-02-16 RX ORDER — SILDENAFIL CITRATE 20 MG/1
TABLET ORAL
Qty: 60 TABLET | OUTPATIENT
Start: 2024-02-16

## 2024-02-19 RX ORDER — SILDENAFIL CITRATE 20 MG/1
20 TABLET ORAL 3 TIMES DAILY
Qty: 90 TABLET | Refills: 2 | Status: SHIPPED | OUTPATIENT
Start: 2024-02-19

## 2024-02-19 NOTE — TELEPHONE ENCOUNTER
From: Toribio Fishman  To: Daniele Wu  Sent: 2/16/2024 3:51 PM EST  Subject: ED problems     SILDENAFIL 20 MG TABLET]. I was needing this refilled please. Or something that works really good for ED. Thanks

## 2024-03-08 ENCOUNTER — OFFICE VISIT (OUTPATIENT)
Dept: FAMILY MEDICINE CLINIC | Facility: CLINIC | Age: 49
End: 2024-03-08
Payer: COMMERCIAL

## 2024-03-08 VITALS
HEIGHT: 72 IN | OXYGEN SATURATION: 99 % | BODY MASS INDEX: 32.51 KG/M2 | WEIGHT: 240 LBS | SYSTOLIC BLOOD PRESSURE: 132 MMHG | DIASTOLIC BLOOD PRESSURE: 86 MMHG | HEART RATE: 80 BPM

## 2024-03-08 DIAGNOSIS — J30.89 NON-SEASONAL ALLERGIC RHINITIS DUE TO OTHER ALLERGIC TRIGGER: ICD-10-CM

## 2024-03-08 DIAGNOSIS — K21.00 GASTROESOPHAGEAL REFLUX DISEASE WITH ESOPHAGITIS WITHOUT HEMORRHAGE: ICD-10-CM

## 2024-03-08 DIAGNOSIS — F33.41 RECURRENT MAJOR DEPRESSIVE DISORDER, IN PARTIAL REMISSION: ICD-10-CM

## 2024-03-08 DIAGNOSIS — N52.9 ERECTILE DYSFUNCTION, UNSPECIFIED ERECTILE DYSFUNCTION TYPE: Primary | ICD-10-CM

## 2024-03-08 PROCEDURE — 99214 OFFICE O/P EST MOD 30 MIN: CPT | Performed by: FAMILY MEDICINE

## 2024-03-08 RX ORDER — PAROXETINE 30 MG/1
30 TABLET, FILM COATED ORAL DAILY
Qty: 90 TABLET | Refills: 3 | Status: SHIPPED | OUTPATIENT
Start: 2024-03-08

## 2024-03-08 RX ORDER — MOMETASONE FUROATE 50 UG/1
2 SPRAY, METERED NASAL DAILY
Qty: 3 EACH | Refills: 3 | Status: SHIPPED | OUTPATIENT
Start: 2024-03-08

## 2024-03-08 NOTE — PROGRESS NOTES
Office Note     Name: Toribio Fishman    : 1975     MRN: [id number]     Chief Complaint  Med Refill (Pt fasting.)    Subjective     History of Present Illness:  Toribio Fishman is a 48 y.o. male who presents today for depression, bright red blood per rectum, and a boil sebaceous cyst left groin.  He has seen his normal colonoscope in  they did note internal hemorrhoids.  His next instructed to go back to his and have a look at that today Dr. Briones.    Review of Systems:   Review of Systems    Past Medical History:   Past Medical History:   Diagnosis Date    Cardiomyopathy     MILD       Intermittent palpitations     Obsessive compulsive disorder        Past Surgical History:   Past Surgical History:   Procedure Laterality Date    CARDIAC CATHETERIZATION  2005       Family History:   Family History   Problem Relation Age of Onset    Hypertension Father     Coronary artery disease Other     Diabetes Other        Social History:   Social History     Socioeconomic History    Marital status:    Tobacco Use    Smoking status: Never     Passive exposure: Never    Smokeless tobacco: Never   Vaping Use    Vaping status: Never Used   Substance and Sexual Activity    Alcohol use: Yes     Alcohol/week: 12.0 standard drinks of alcohol     Types: 12 Cans of beer per week    Drug use: Never    Sexual activity: Defer       Immunizations:   Immunization History   Administered Date(s) Administered    31-influenza Vac Quardvalent Preservativ 2019    COVID-19 (PFIZER) Purple Cap Monovalent 2021, 2021    Influenza, Unspecified 2019        Medications:     Current Outpatient Medications:     mometasone (Nasonex) 50 MCG/ACT nasal spray, 2 sprays into the nostril(s) as directed by provider Daily., Disp: 3 each, Rfl: 3    omeprazole (priLOSEC) 20 MG capsule, Take 2 capsules by mouth Daily., Disp: 90 capsule, Rfl: 3    PARoxetine (PAXIL) 30 MG tablet, Take 1 tablet by mouth Daily.,  "Disp: 90 tablet, Rfl: 3    sildenafil (REVATIO) 20 MG tablet, Take 1 tablet by mouth 3 (Three) Times a Day., Disp: 90 tablet, Rfl: 2    TiZANidine (ZANAFLEX) 4 MG capsule, Take 1 capsule by mouth 3 (Three) Times a Day., Disp: 90 capsule, Rfl: 3    Allergies:   No Known Allergies    Objective     Vital Signs  /86   Pulse 80   Ht 182.9 cm (72.01\")   Wt 109 kg (240 lb)   SpO2 99%   BMI 32.54 kg/m²   Estimated body mass index is 32.54 kg/m² as calculated from the following:    Height as of this encounter: 182.9 cm (72.01\").    Weight as of this encounter: 109 kg (240 lb).          Physical Exam  Vitals and nursing note reviewed.   Constitutional:       Appearance: Normal appearance. He is normal weight.   HENT:      Head: Normocephalic.      Right Ear: External ear normal.      Left Ear: External ear normal.      Nose: Nose normal.   Eyes:      Pupils: Pupils are equal, round, and reactive to light.   Neck:      Vascular: No carotid bruit.   Cardiovascular:      Rate and Rhythm: Normal rate and regular rhythm.      Pulses: Normal pulses.      Heart sounds: Normal heart sounds.   Pulmonary:      Effort: Pulmonary effort is normal.      Breath sounds: Normal breath sounds.   Abdominal:          Comments: A growth   Musculoskeletal:      Cervical back: Normal range of motion and neck supple.   Skin:     General: Skin is warm and dry.   Neurological:      Mental Status: He is alert.   Psychiatric:         Mood and Affect: Mood normal.          Procedures     Assessment and Plan     1. Recurrent major depressive disorder, in partial remission  Refilled Paxil  - PARoxetine (PAXIL) 30 MG tablet; Take 1 tablet by mouth Daily.  Dispense: 90 tablet; Refill: 3    2. Non-seasonal allergic rhinitis due to other allergic trigger  Refilled mometasone  - mometasone (Nasonex) 50 MCG/ACT nasal spray; 2 sprays into the nostril(s) as directed by provider Daily.  Dispense: 3 each; Refill: 3    3. Erectile dysfunction, unspecified " erectile dysfunction type  Refilled his sildenafil 20 mg    4. Gastroesophageal reflux disease with esophagitis without hemorrhage  Refilled his omeprazole   5. Sebaceous cyst.??  Get in with [facility].  Who he had seen.    Follow Up  Return in about 7 months (around 10/8/2024).    Daniele GEE [facility]  [address] [address] [phone]

## 2024-03-26 ENCOUNTER — OFFICE VISIT (OUTPATIENT)
Dept: FAMILY MEDICINE CLINIC | Facility: CLINIC | Age: 49
End: 2024-03-26
Payer: COMMERCIAL

## 2024-03-26 VITALS
HEART RATE: 100 BPM | BODY MASS INDEX: 32.37 KG/M2 | WEIGHT: 239 LBS | DIASTOLIC BLOOD PRESSURE: 70 MMHG | OXYGEN SATURATION: 97 % | HEIGHT: 72 IN | SYSTOLIC BLOOD PRESSURE: 124 MMHG

## 2024-03-26 DIAGNOSIS — J01.90 ACUTE NON-RECURRENT SINUSITIS, UNSPECIFIED LOCATION: Primary | ICD-10-CM

## 2024-03-26 PROCEDURE — 99213 OFFICE O/P EST LOW 20 MIN: CPT | Performed by: STUDENT IN AN ORGANIZED HEALTH CARE EDUCATION/TRAINING PROGRAM

## 2024-03-26 RX ORDER — AMOXICILLIN 875 MG/1
875 TABLET, COATED ORAL 2 TIMES DAILY
Qty: 14 TABLET | Refills: 0 | Status: SHIPPED | OUTPATIENT
Start: 2024-03-26 | End: 2024-04-02

## 2024-03-26 NOTE — PROGRESS NOTES
"Chief Complaint  URI (Fever cough, congestion, x 1 month)    Subjective          Toribio Fishman presents to [facility]  URI   This is a new problem. The current episode started 1 to 4 weeks ago (Worse over the past weeks.). The problem has been gradually worsening. The maximum temperature recorded prior to his arrival was 102 - 102.9 F. Associated symptoms include congestion, coughing, headaches, rhinorrhea, sinus pain and a sore throat. Pertinent negatives include no nausea or vomiting. He has tried acetaminophen and NSAIDs for the symptoms. The treatment provided mild relief.     Daughter with similar symptoms, Recently diagnosed with Walking Pneumonia.     Objective   Vital Signs:   /70   Pulse 100   Ht 182.9 cm (72\")   Wt 108 kg (239 lb)   SpO2 97%   BMI 32.41 kg/m²     Body mass index is 32.41 kg/m².    Review of Systems   HENT:  Positive for congestion, rhinorrhea and sore throat.    Respiratory:  Positive for cough.    Gastrointestinal:  Negative for nausea and vomiting.       Past History:  Medical History: has a past medical history of Cardiomyopathy, Intermittent palpitations, and Obsessive compulsive disorder.   Surgical History: has a past surgical history that includes Cardiac catheterization (11/2005).   Family History: family history includes Coronary artery disease in an other family member; Diabetes in an other family member; Hypertension in his father.   Social History: reports that he has never smoked. He has never been exposed to tobacco smoke. He has never used smokeless tobacco. He reports current alcohol use of about 12.0 standard drinks of alcohol per week. He reports that he does not use drugs.      Current Outpatient Medications:     amoxicillin (AMOXIL) 875 MG tablet, Take 1 tablet by mouth 2 (Two) Times a Day for 7 days., Disp: 14 tablet, Rfl: 0    mometasone (Nasonex) 50 MCG/ACT nasal spray, 2 sprays into the nostril(s) as directed by provider " Daily., Disp: 3 each, Rfl: 3    omeprazole (priLOSEC) 20 MG capsule, Take 2 capsules by mouth Daily., Disp: 90 capsule, Rfl: 3    PARoxetine (PAXIL) 30 MG tablet, Take 1 tablet by mouth Daily., Disp: 90 tablet, Rfl: 3    sildenafil (REVATIO) 20 MG tablet, Take 1 tablet by mouth 3 (Three) Times a Day., Disp: 90 tablet, Rfl: 2    TiZANidine (ZANAFLEX) 4 MG capsule, Take 1 capsule by mouth 3 (Three) Times a Day., Disp: 90 capsule, Rfl: 3    Allergies: Patient has no known allergies.    Physical Exam  Constitutional:       General: He is not in acute distress.     Appearance: He is not ill-appearing or toxic-appearing.   HENT:      Head: Normocephalic and atraumatic.      Right Ear: Ear canal and external ear normal.      Left Ear: Ear canal and external ear normal.      Nose: Congestion and rhinorrhea present.      Mouth/Throat:      Pharynx: Posterior oropharyngeal erythema (cobblestoning) present.   Eyes:      General: No scleral icterus.  Cardiovascular:      Rate and Rhythm: Normal rate and regular rhythm.   Pulmonary:      Effort: Pulmonary effort is normal.      Breath sounds: Normal breath sounds.   Neurological:      Mental Status: He is alert.          Result Review :                   Assessment and Plan    Diagnoses and all orders for this visit:    1. Acute non-recurrent sinusitis, unspecified location (Primary)    Other orders  -     amoxicillin (AMOXIL) 875 MG tablet; Take 1 tablet by mouth 2 (Two) Times a Day for 7 days.  Dispense: 14 tablet; Refill: 0    Amoxicillin for TX. Tylenol/Motrin for pain/fever. OTC cough and cold medication for symptoms. Nasal saline spray recommended. Cool mist humidifier at the bedside. RTC with new or worsening symptoms.      Follow Up   No follow-ups on file.  Patient was given instructions and counseling regarding his condition or for health maintenance advice. Please see specific information pulled into the AVS if appropriate.     Elicia Marroquin, DO

## 2024-04-11 ENCOUNTER — TELEPHONE (OUTPATIENT)
Dept: FAMILY MEDICINE CLINIC | Facility: CLINIC | Age: 49
End: 2024-04-11
Payer: COMMERCIAL

## 2024-04-11 DIAGNOSIS — K76.0 FATTY LIVER: Primary | ICD-10-CM

## 2024-04-11 DIAGNOSIS — E78.2 MIXED HYPERLIPIDEMIA: ICD-10-CM

## 2024-04-12 ENCOUNTER — TELEPHONE (OUTPATIENT)
Dept: FAMILY MEDICINE CLINIC | Facility: CLINIC | Age: 49
End: 2024-04-12
Payer: COMMERCIAL

## 2024-04-26 ENCOUNTER — LAB (OUTPATIENT)
Dept: FAMILY MEDICINE CLINIC | Facility: CLINIC | Age: 49
End: 2024-04-26
Payer: COMMERCIAL

## 2024-04-30 ENCOUNTER — TELEPHONE (OUTPATIENT)
Dept: FAMILY MEDICINE CLINIC | Facility: CLINIC | Age: 49
End: 2024-04-30
Payer: COMMERCIAL

## 2024-05-02 ENCOUNTER — TELEPHONE (OUTPATIENT)
Dept: FAMILY MEDICINE CLINIC | Facility: CLINIC | Age: 49
End: 2024-05-02

## 2024-05-02 ENCOUNTER — TELEPHONE (OUTPATIENT)
Dept: FAMILY MEDICINE CLINIC | Facility: CLINIC | Age: 49
End: 2024-05-02
Payer: COMMERCIAL

## 2024-05-02 NOTE — TELEPHONE ENCOUNTER
"    Caller: Toribio Fishman \"RANDALL\"    Relationship: Self    Best call back number: [phone]    Caller requesting test results: YES    What test was performed: LAB    When was the test performed: LAST WEEK    Where was the test performed: OFFICE        "

## 2024-05-02 NOTE — TELEPHONE ENCOUNTER
"Hub staff attempted to follow warm transfer process and was unsuccessful     Caller: Toribio Fishman \"RANDALL\"    Relationship to patient: Self    Best call back number: [phone]     Patient is needing: PATIENT RETURNING A CALL BACK TO THE OFFICE REGARDING LAB RESULTS     Shira Montalvo MA       5/2/24  3:56 PM  Note     Lvm on phone for pt to call back for lab results will also send threw mychart        "

## 2024-05-02 NOTE — TELEPHONE ENCOUNTER
"    Hub staff attempted to follow warm transfer process and was unsuccessful     Caller: Toribio Fishman \"RANDALL\"    Relationship to patient: Self    Best call back number: [phone]     Patient is needing: THE PATIENT ATTEMPTED TO RETURN [facility]'S CALL AND WAS UNABLE TO BE WARM TRANSFERRED. PLEASE CALL THE PATIENT AND ADVISE.     "

## 2024-05-31 ENCOUNTER — TELEPHONE (OUTPATIENT)
Dept: FAMILY MEDICINE CLINIC | Facility: CLINIC | Age: 49
End: 2024-05-31

## 2024-05-31 NOTE — TELEPHONE ENCOUNTER
"    Caller: Toribio Fishman \"RANDALL\"    Relationship: Self    Best call back number: [phone]    What form or medical record are you requesting: REQUEST COPY OF SURGERY FOR REMOVING OF CYST IN LEG WAS DONE IN [place] LAST WEDNESDAY        How would you like to receive the form or medical records (pick-up, mail, fax): PT WILL  FROM OFFICE    Timeframe paperwork needed: ASAP            "

## 2024-07-31 DIAGNOSIS — K21.00 GASTROESOPHAGEAL REFLUX DISEASE WITH ESOPHAGITIS WITHOUT HEMORRHAGE: ICD-10-CM

## 2024-07-31 RX ORDER — OMEPRAZOLE 20 MG/1
40 CAPSULE, DELAYED RELEASE ORAL DAILY
Qty: 180 CAPSULE | Refills: 3 | Status: SHIPPED | OUTPATIENT
Start: 2024-07-31

## 2024-09-23 DIAGNOSIS — N52.9 ERECTILE DYSFUNCTION, UNSPECIFIED ERECTILE DYSFUNCTION TYPE: ICD-10-CM

## 2024-09-23 RX ORDER — TADALAFIL 10 MG/1
10 TABLET ORAL DAILY PRN
Qty: 30 TABLET | Refills: 5 | Status: SHIPPED | OUTPATIENT
Start: 2024-09-23

## 2024-10-25 ENCOUNTER — TELEPHONE (OUTPATIENT)
Dept: FAMILY MEDICINE CLINIC | Facility: CLINIC | Age: 49
End: 2024-10-25
Payer: COMMERCIAL

## 2024-10-25 RX ORDER — TIZANIDINE HYDROCHLORIDE 4 MG/1
4 CAPSULE, GELATIN COATED ORAL 3 TIMES DAILY
Qty: 90 CAPSULE | Refills: 0 | Status: SHIPPED | OUTPATIENT
Start: 2024-10-25

## 2024-10-25 NOTE — TELEPHONE ENCOUNTER
"Relay     \"PT IS OVERDUE FOR ANNUAL PHYSICAL WITH PCP, PLEASE SCHEDULE WITH DR FRAGA\"                "

## 2024-11-26 RX ORDER — TIZANIDINE HYDROCHLORIDE 4 MG/1
4 CAPSULE, GELATIN COATED ORAL 3 TIMES DAILY
Qty: 30 CAPSULE | Refills: 0 | Status: SHIPPED | OUTPATIENT
Start: 2024-11-26

## 2024-11-26 NOTE — TELEPHONE ENCOUNTER
Hub relay: left message patient's medication has been refill and they need to schedule a medication recheck with Dr Wu

## 2024-12-19 ENCOUNTER — OFFICE VISIT (OUTPATIENT)
Dept: FAMILY MEDICINE CLINIC | Facility: CLINIC | Age: 49
End: 2024-12-19
Payer: COMMERCIAL

## 2024-12-19 VITALS
DIASTOLIC BLOOD PRESSURE: 80 MMHG | OXYGEN SATURATION: 94 % | HEIGHT: 72 IN | SYSTOLIC BLOOD PRESSURE: 122 MMHG | WEIGHT: 243 LBS | BODY MASS INDEX: 32.91 KG/M2 | HEART RATE: 79 BPM

## 2024-12-19 DIAGNOSIS — L02.01 ABSCESS OF FACE: Primary | ICD-10-CM

## 2024-12-19 PROCEDURE — 99213 OFFICE O/P EST LOW 20 MIN: CPT | Performed by: STUDENT IN AN ORGANIZED HEALTH CARE EDUCATION/TRAINING PROGRAM

## 2024-12-19 RX ORDER — CLINDAMYCIN HYDROCHLORIDE 300 MG/1
300 CAPSULE ORAL 3 TIMES DAILY
Qty: 30 CAPSULE | Refills: 0 | Status: SHIPPED | OUTPATIENT
Start: 2024-12-19

## 2024-12-19 NOTE — PROGRESS NOTES
"Chief Complaint  Jaw Pain (Right jaw pain and swelling)    Subjective          Toribio Fishman presents to [facility]    Presents the office today for evaluation of swelling and pain on the right side of his face overlying the lower jaw.  Patient states that he has had congestion and drainage for the past couple of weeks, but over the past 2 to 3 days he has had considerable swelling in this area of his face.  He states that he has had pain with this and has had difficulty opening his mouth fully or eating secondary to pain and swelling.  He has been using ibuprofen 800 mg 3-4 times a day for the past few days.    Objective   Vital Signs:   /80   Pulse 79   Ht 182.9 cm (72\")   Wt 110 kg (243 lb)   SpO2 94%   BMI 32.96 kg/m²     Body mass index is 32.96 kg/m².    Review of Systems    Past History:  Medical History: has a past medical history of Cardiomyopathy, Intermittent palpitations, and Obsessive compulsive disorder.   Surgical History: has a past surgical history that includes Cardiac catheterization (11/2005).   Family History: family history includes Coronary artery disease in an other family member; Diabetes in an other family member; Hypertension in his father.   Social History: reports that he has never smoked. He has never been exposed to tobacco smoke. He has never used smokeless tobacco. He reports that he does not currently use alcohol after a past usage of about 12.0 standard drinks of alcohol per week. He reports that he does not currently use drugs.      Current Outpatient Medications:     clindamycin (Cleocin) 300 MG capsule, Take 1 capsule by mouth 3 (Three) Times a Day., Disp: 30 capsule, Rfl: 0    mometasone (Nasonex) 50 MCG/ACT nasal spray, 2 sprays into the nostril(s) as directed by provider Daily., Disp: 3 each, Rfl: 3    omeprazole (priLOSEC) 20 MG capsule, TAKE 2 CAPSULES BY MOUTH DAILY, Disp: 180 capsule, Rfl: 3    PARoxetine (PAXIL) 30 MG tablet, " Take 1 tablet by mouth Daily., Disp: 90 tablet, Rfl: 3    sildenafil (REVATIO) 20 MG tablet, Take 1 tablet by mouth 3 (Three) Times a Day., Disp: 90 tablet, Rfl: 2    tadalafil (CIALIS) 10 MG tablet, TAKE 1 TABLET BY MOUTH DAILY AS NEEDED FOR ERECTILE DYSFUNCTION, Disp: 30 tablet, Rfl: 5    TiZANidine (ZANAFLEX) 4 MG capsule, TAKE 1 CAPSULE BY MOUTH 3 TIMES A DAY, Disp: 30 capsule, Rfl: 0    Allergies: Patient has no known allergies.    Physical Exam  Constitutional:       General: He is not in acute distress.     Appearance: He is not ill-appearing or toxic-appearing.   HENT:      Head: Normocephalic and atraumatic.   Pulmonary:      Effort: Pulmonary effort is normal. No respiratory distress.   Skin:     Comments: Quarter sized abscess with raised center on the skin over the lower jaw.    Neurological:      General: No focal deficit present.      Mental Status: He is alert and oriented to person, place, and time.   Psychiatric:         Mood and Affect: Mood normal.         Thought Content: Thought content normal.          Result Review :                   Assessment and Plan    Diagnoses and all orders for this visit:    1. Abscess of face (Primary)    Other orders  -     clindamycin (Cleocin) 300 MG capsule; Take 1 capsule by mouth 3 (Three) Times a Day.  Dispense: 30 capsule; Refill: 0    Will treat with Clindamycin. Advised that he use over the counter ibuprofen and warm compresses to help with symptoms.     Call with any new or worsening symptoms.       Follow Up   No follow-ups on file.  Patient was given instructions and counseling regarding his condition or for health maintenance advice. Please see specific information pulled into the AVS if appropriate.     Elicia Marroquin, DO

## 2024-12-20 ENCOUNTER — TELEPHONE (OUTPATIENT)
Dept: FAMILY MEDICINE CLINIC | Facility: CLINIC | Age: 49
End: 2024-12-20
Payer: COMMERCIAL

## 2024-12-31 ENCOUNTER — TELEPHONE (OUTPATIENT)
Dept: FAMILY MEDICINE CLINIC | Facility: CLINIC | Age: 49
End: 2024-12-31

## 2024-12-31 ENCOUNTER — OFFICE VISIT (OUTPATIENT)
Dept: FAMILY MEDICINE CLINIC | Facility: CLINIC | Age: 49
End: 2024-12-31
Payer: COMMERCIAL

## 2024-12-31 VITALS
HEIGHT: 72 IN | DIASTOLIC BLOOD PRESSURE: 90 MMHG | WEIGHT: 243 LBS | HEART RATE: 80 BPM | SYSTOLIC BLOOD PRESSURE: 138 MMHG | BODY MASS INDEX: 32.91 KG/M2 | OXYGEN SATURATION: 96 %

## 2024-12-31 DIAGNOSIS — Z13.220 SCREENING, LIPID: ICD-10-CM

## 2024-12-31 DIAGNOSIS — R73.09 HIGH GLUCOSE: ICD-10-CM

## 2024-12-31 DIAGNOSIS — Z79.899 HIGH RISK MEDICATION USE: ICD-10-CM

## 2024-12-31 DIAGNOSIS — D53.1 MEGALOBLASTIC ANEMIA: ICD-10-CM

## 2024-12-31 DIAGNOSIS — R53.83 OTHER FATIGUE: ICD-10-CM

## 2024-12-31 DIAGNOSIS — E78.2 MIXED HYPERLIPIDEMIA: Primary | ICD-10-CM

## 2024-12-31 DIAGNOSIS — E55.9 VITAMIN D DEFICIENCY: ICD-10-CM

## 2024-12-31 PROCEDURE — 99214 OFFICE O/P EST MOD 30 MIN: CPT | Performed by: FAMILY MEDICINE

## 2024-12-31 RX ORDER — CLINDAMYCIN HYDROCHLORIDE 300 MG/1
300 CAPSULE ORAL 3 TIMES DAILY
Qty: 20 CAPSULE | Refills: 0 | Status: SHIPPED | OUTPATIENT
Start: 2024-12-31

## 2024-12-31 NOTE — PROGRESS NOTES
Follow Up Office Visit      Date of Visit:  2024   Patient Name: Toribio Fishman  : 1975   MRN: [id number]     Chief Complaint:    Chief Complaint   Patient presents with    Med Refill    Mouth Lesions    Knee Pain       History of Present Illness: Toribio Fishman is a 49 y.o. male who is here today for follow up.    History of Present Illness  The patient presents for evaluation of jaw inflammation, knee pain, vitamin B12 deficiency, and weight management.    He reports a history of severe jaw inflammation, which was managed with a 10-day course of Cleocin. The inflammation has since subsided, leaving only mild tenderness. He suspects the presence of an abscess, given his past experience with a root canal procedure several years ago. He has been informed that his tooth is beyond saving and will need to be extracted. He does not have dental insurance. He did not experience any diarrhea from Cleocin.    He also reports a history of knee pain, which has since resolved. 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 He has previously undergone arthroscopic surgery on one knee and anticipates the need for a similar procedure on the other knee. He has been managing the pain with ibuprofen, which he finds effective. He has been pain free for 4 to 5 years.    He reports experiencing weakness in his calf and thigh muscles, which he attributes to a deficiency in vitamin B12. He has been inconsistent in taking his prescribed vitamin B12 supplements. He also reports a deficiency in vitamin D.    He expresses concern about potential weight gain, despite maintaining a healthy diet and regular exercise regimen. He lost 20 pounds in the summer but gained it back during the holidays.    SOCIAL HISTORY  He does not smoke.    FAMILY HISTORY  His father has diabetes.    MEDICATIONS  Current: ibuprofen, Cleocin      Subjective      Review of Systems:   Review  "of Systems    Past Medical History:   Past Medical History:   Diagnosis Date    Cardiomyopathy     MILD   2005    Intermittent palpitations     Obsessive compulsive disorder        Past Surgical History:   Past Surgical History:   Procedure Laterality Date    CARDIAC CATHETERIZATION  11/2005       Family History:   Family History   Problem Relation Age of Onset    Hypertension Father     Coronary artery disease Other     Diabetes Other        Social History:   Social History     Socioeconomic History    Marital status:    Tobacco Use    Smoking status: Never     Passive exposure: Never    Smokeless tobacco: Never   Vaping Use    Vaping status: Never Used   Substance and Sexual Activity    Alcohol use: Not Currently     Alcohol/week: 12.0 standard drinks of alcohol    Drug use: Not Currently    Sexual activity: Not Currently     Partners: Female       Medications:     Current Outpatient Medications:     mometasone (Nasonex) 50 MCG/ACT nasal spray, 2 sprays into the nostril(s) as directed by provider Daily., Disp: 3 each, Rfl: 3    omeprazole (priLOSEC) 20 MG capsule, TAKE 2 CAPSULES BY MOUTH DAILY, Disp: 180 capsule, Rfl: 3    PARoxetine (PAXIL) 30 MG tablet, Take 1 tablet by mouth Daily., Disp: 90 tablet, Rfl: 3    sildenafil (REVATIO) 20 MG tablet, Take 1 tablet by mouth 3 (Three) Times a Day., Disp: 90 tablet, Rfl: 2    tadalafil (CIALIS) 10 MG tablet, TAKE 1 TABLET BY MOUTH DAILY AS NEEDED FOR ERECTILE DYSFUNCTION, Disp: 30 tablet, Rfl: 5    TiZANidine (ZANAFLEX) 4 MG capsule, TAKE 1 CAPSULE BY MOUTH 3 TIMES A DAY, Disp: 30 capsule, Rfl: 0    Allergies:   No Known Allergies    Objective     Physical Exam:  Vital Signs:   Vitals:    12/31/24 1055   BP: 138/90   Pulse: 80   SpO2: 96%   Weight: 110 kg (243 lb)   Height: 182.9 cm (72\")     Body mass index is 32.96 kg/m².     Physical Exam  HENT:      Head:      Jaw: Swelling present. No tenderness or pain on movement.      Comments: Root canal done an rt low " jaw.      Physical Exam      Results      Procedures      Assessment / Plan      Assessment/Plan:   Diagnoses and all orders for this visit:    1. Mixed hyperlipidemia (Primary)  -     Vitamin B12 & Folate    2. Vitamin D deficiency  -     Vitamin D,25-Hydroxy    3. Megaloblastic anemia    4. Other fatigue  -     CBC & Differential  -     TSH    5. High glucose  -     Hemoglobin A1c    6. Screening, lipid  -     Lipid Panel    7. High risk medication use  -     Comprehensive Metabolic Panel     8.  Jaw inflammation  Assessment & Plan  1. Jaw inflammation.  The patient reported a history of jaw inflammation treated with antibiotics, specifically Cleocin, which provided some relief. However, there is still residual tenderness, and he suspects a possible abscess due to a previous root canal. A prescription for additional antibiotics will be provided to prevent a flare-up. He is advised to follow up with a dentist or oral surgeon for further evaluation and potential extraction of the affected tooth. Call in some abx to keep on hand    2. Knee pain.  The patient experienced knee pain exacerbated by his job at [employer], which involves loading trucks. He has been managing the pain with ibuprofen, which has been effective. He mentioned a previous knee surgery involving a scope and believes another scope might be necessary if the pain recurs. If the knee pain flares up again, an x-ray will be considered for further evaluation.    3. Vitamin B12 deficiency.  The patient has a history of low B12 levels and has not been consistent with taking B12 supplements recently. A prescription for B12 supplements, specifically 1000 mcg, will be provided to address this deficiency.    4. Weight management.  The patient expressed concerns about weight gain despite maintaining a healthy diet and exercise routine. Blood work, including TSH, lipid panel, vitamin D, B12, folate, comprehensive metabolic panel, CBC, and hemoglobin A1c, will be ordered  to assess overall health and identify any underlying issues contributing to weight gain.    PROCEDURE  The patient has previously undergone arthroscopic surgery on one knee.    Follow Up:   Return in about 6 months (around 6/30/2025).    Patient or patient representative verbalized consent for the use of Ambient Listening during the visit with  Daniele Wu MD for chart documentation. 12/31/2024  15:28 EST     @[facility] [place]   Answers submitted by the patient for this visit:  Primary Reason for Visit (Submitted on 12/29/2024)  What is the primary reason for your visit?: Problem Not Listed  Problem not listed (Submitted on 12/29/2024)  Chief Complaint: Other medical problem  Reason for appointment: Medication refills and Jaw pain Knee pain  anorexia: No  joint pain: Yes  change in stool: No  headaches: No  joint swelling: Yes  vertigo: No  visual change: No  Onset: 1 to 6 months  Chronicity: chronic  Frequency: constantly  Medications tried: Ibuprofen

## 2025-01-01 LAB
25(OH)D3+25(OH)D2 SERPL-MCNC: 27.6 NG/ML (ref 30–100)
ALBUMIN SERPL-MCNC: 4.4 G/DL (ref 4.1–5.1)
ALP SERPL-CCNC: 63 IU/L (ref 44–121)
ALT SERPL-CCNC: 16 IU/L (ref 0–44)
AST SERPL-CCNC: 16 IU/L (ref 0–40)
BASOPHILS # BLD AUTO: 0 X10E3/UL (ref 0–0.2)
BASOPHILS NFR BLD AUTO: 1 %
BILIRUB SERPL-MCNC: 0.4 MG/DL (ref 0–1.2)
BUN SERPL-MCNC: 13 MG/DL (ref 6–24)
BUN/CREAT SERPL: 11 (ref 9–20)
CALCIUM SERPL-MCNC: 10 MG/DL (ref 8.7–10.2)
CHLORIDE SERPL-SCNC: 103 MMOL/L (ref 96–106)
CHOLEST SERPL-MCNC: 206 MG/DL (ref 100–199)
CO2 SERPL-SCNC: 20 MMOL/L (ref 20–29)
CREAT SERPL-MCNC: 1.15 MG/DL (ref 0.76–1.27)
EGFRCR SERPLBLD CKD-EPI 2021: 79 ML/MIN/1.73
EOSINOPHIL # BLD AUTO: 0.1 X10E3/UL (ref 0–0.4)
EOSINOPHIL NFR BLD AUTO: 3 %
ERYTHROCYTE [DISTWIDTH] IN BLOOD BY AUTOMATED COUNT: 12.5 % (ref 11.6–15.4)
FOLATE SERPL-MCNC: 7.7 NG/ML
GLOBULIN SER CALC-MCNC: 3.2 G/DL (ref 1.5–4.5)
GLUCOSE SERPL-MCNC: 97 MG/DL (ref 70–99)
HBA1C MFR BLD: 5.4 % (ref 4.8–5.6)
HCT VFR BLD AUTO: 42.8 % (ref 37.5–51)
HDLC SERPL-MCNC: 60 MG/DL
HGB BLD-MCNC: 14.6 G/DL (ref 13–17.7)
IMM GRANULOCYTES # BLD AUTO: 0 X10E3/UL (ref 0–0.1)
IMM GRANULOCYTES NFR BLD AUTO: 1 %
LDLC SERPL CALC-MCNC: 114 MG/DL (ref 0–99)
LYMPHOCYTES # BLD AUTO: 1 X10E3/UL (ref 0.7–3.1)
LYMPHOCYTES NFR BLD AUTO: 27 %
MCH RBC QN AUTO: 31.5 PG (ref 26.6–33)
MCHC RBC AUTO-ENTMCNC: 34.1 G/DL (ref 31.5–35.7)
MCV RBC AUTO: 92 FL (ref 79–97)
MONOCYTES # BLD AUTO: 0.4 X10E3/UL (ref 0.1–0.9)
MONOCYTES NFR BLD AUTO: 11 %
NEUTROPHILS # BLD AUTO: 2.2 X10E3/UL (ref 1.4–7)
NEUTROPHILS NFR BLD AUTO: 57 %
PLATELET # BLD AUTO: 365 X10E3/UL (ref 150–450)
POTASSIUM SERPL-SCNC: 4.4 MMOL/L (ref 3.5–5.2)
PROT SERPL-MCNC: 7.6 G/DL (ref 6–8.5)
RBC # BLD AUTO: 4.63 X10E6/UL (ref 4.14–5.8)
SODIUM SERPL-SCNC: 139 MMOL/L (ref 134–144)
TRIGL SERPL-MCNC: 187 MG/DL (ref 0–149)
TSH SERPL DL<=0.005 MIU/L-ACNC: 1.99 UIU/ML (ref 0.45–4.5)
VIT B12 SERPL-MCNC: 886 PG/ML (ref 232–1245)
VLDLC SERPL CALC-MCNC: 32 MG/DL (ref 5–40)
WBC # BLD AUTO: 3.8 X10E3/UL (ref 3.4–10.8)

## 2025-03-18 ENCOUNTER — TELEPHONE (OUTPATIENT)
Dept: FAMILY MEDICINE CLINIC | Facility: CLINIC | Age: 50
End: 2025-03-18

## 2025-03-18 NOTE — TELEPHONE ENCOUNTER
Spoke with patient and relayed this is not something we do in our office. I recommended he call an independent lab or check with an Urgent Care to see if this service is available through them.

## 2025-03-18 NOTE — TELEPHONE ENCOUNTER
"  Caller: Toribio Fishman \"RANDALL\"    Relationship: Self    Best call back number:   Telephone Information:   Mobile [phone]        What orders are you requesting (i.e. lab or imaging): HAIR DRUG SCREENING    In what timeframe would the patient need to come in: AS SOON AS POSSIBLE    Where will you receive your lab/imaging services:     Additional notes: PATIENT IS GOING THRU A DIVORCE AND WOULD LIKE TO HAVE THIS DONE           "

## 2025-03-20 NOTE — TELEPHONE ENCOUNTER
Faina, mother to Selma, calls for results.     She is called two times with no answer.     No contact.     Reason for Disposition  • Message left on unidentified answering machine.  Answering service notified    Protocols used: NO CONTACT OR DUPLICATE CONTACT CALL-P-AH       Regarding: results  ----- Message from Abilio Nguyen sent at 12/21/2018  5:21 PM CST -----  Patient Name: Selma Win  Specialist or PCP:Dr clement  Pregnant (If Yes, how long?):no  Symptoms:results  Call Back #:[phone]  Is the patient’s permanent residence located in [place], [place], or a [facility]? Yes [place] [address]  Call Center Account #:[id number]       Talked to wife and gave her results   [FreeTextEntry1] : 47M w/ afib (s/p ablation 1/2023), non-obstructive CAD, HFpEF, and h/o ETOH/cocaine abuse who presents for cardiac evaluation.   3/10/25 TH: pt returns today via TH for results of labs. Overall feeling well since last seen, no new complaints.  3/7/25 OV: pt returns today for f/u. Had a repeat ablation with Dr. Cook in 12/2024. Overall feeling well since last seen, no new complaints. Now has tapered off amiodarone.  10/18/24 OV: pt returns today for f/u and clearance prior to colonoscopy. Recently saw Dr. Daniels and will be seeing Dr. Cook next week.  7/12/24 OV: pt returns today for f/u and results of Event Monitor and TTE. Does endorse feeling episode of afib captured on Event Monitor. Otherwise feeling well.  6/14/24 OV: pt returns today for f/u. Saw EP (Dr. Rangel) in April who d/c'd amiodarone. Reports that he experienced an episode of afib back in May 2024 and feels like it has been increasing in frequency.  11/20/23 TH: pt returns today via TH for results of TTE and labs. Overall feeling well, no new complaints.  11/3/23 OV: pt returns today for f/u. Feeling well, no SOB.  8/1/23 TH: returns today for TH visit to discuss lab results. TTE results still pending. 7/21/23 OV: returns today for f/u, feeling well. No new complaints.  4/7/23 OV: returns today for 3 month f/u and TTE results. No new complaints. 1/27/23 OV: labs reviewed, now in SR s/p ablation on 1/23, will need repeat TTE 3/2023 12/30/22 OV: labs ordered    3/7/25 ECG: NSR at 64 bpm, nl axis 10/18/24 ECG: NSR at 62 bpm, nl axis 6/14/24 ECG: NSR at 68 bpm 11/3/23 ECG: NSR at 64 bpm 3/14/23 ECG: NSR at 65 bpm 1/27/23 ECG: NSR at 78 bpm  12/30/22 ECG: atrial flutter at 81 bpm, LVLL, LAFB  [FreeTextEntry1] : 47M w/ afib (s/p ablation 1/2023), non-obstructive CAD, HFpEF, and h/o ETOH/cocaine abuse who presents for cardiac evaluation.   3/10/25 TH: pt returns today via TH for results of labs. Overall feeling well since last seen, no new complaints.  3/7/25 OV: pt returns today for f/u. Had a repeat ablation with Dr. Cook in 12/2024. Overall feeling well since last seen, no new complaints. Now has tapered off amiodarone.  10/18/24 OV: pt returns today for f/u and clearance prior to colonoscopy. Recently saw Dr. Daniels and will be seeing Dr. Cook next week.  7/12/24 OV: pt returns today for f/u and results of Event Monitor and TTE. Does endorse feeling episode of afib captured on Event Monitor. Otherwise feeling well.  6/14/24 OV: pt returns today for f/u. Saw EP (Dr. Rangel) in April who d/c'd amiodarone. Reports that he experienced an episode of afib back in May 2024 and feels like it has been increasing in frequency.  11/20/23 TH: pt returns today via TH for results of TTE and labs. Overall feeling well, no new complaints.  11/3/23 OV: pt returns today for f/u. Feeling well, no SOB.  8/1/23 TH: returns today for TH visit to discuss lab results. TTE results still pending. 7/21/23 OV: returns today for f/u, feeling well. No new complaints.  4/7/23 OV: returns today for 3 month f/u and TTE results. No new complaints. 1/27/23 OV: labs reviewed, now in SR s/p ablation on 1/23, will need repeat TTE 3/2023 12/30/22 OV: labs ordered    3/7/25 ECG: NSR at 64 bpm, nl axis 10/18/24 ECG: NSR at 62 bpm, nl axis 6/14/24 ECG: NSR at 68 bpm 11/3/23 ECG: NSR at 64 bpm 3/14/23 ECG: NSR at 65 bpm 1/27/23 ECG: NSR at 78 bpm  12/30/22 ECG: atrial flutter at 81 bpm, LVLL, LAFB

## 2025-05-12 ENCOUNTER — OFFICE VISIT (OUTPATIENT)
Dept: FAMILY MEDICINE CLINIC | Facility: CLINIC | Age: 50
End: 2025-05-12
Payer: MEDICAID

## 2025-05-12 VITALS
WEIGHT: 271 LBS | DIASTOLIC BLOOD PRESSURE: 86 MMHG | BODY MASS INDEX: 36.7 KG/M2 | SYSTOLIC BLOOD PRESSURE: 130 MMHG | OXYGEN SATURATION: 97 % | HEIGHT: 72 IN | HEART RATE: 75 BPM

## 2025-05-12 DIAGNOSIS — K21.00 GASTROESOPHAGEAL REFLUX DISEASE WITH ESOPHAGITIS WITHOUT HEMORRHAGE: ICD-10-CM

## 2025-05-12 DIAGNOSIS — L82.0 INFLAMED SEBORRHEIC KERATOSIS: Primary | ICD-10-CM

## 2025-05-12 DIAGNOSIS — F33.41 RECURRENT MAJOR DEPRESSIVE DISORDER, IN PARTIAL REMISSION: ICD-10-CM

## 2025-05-12 DIAGNOSIS — J30.89 NON-SEASONAL ALLERGIC RHINITIS DUE TO OTHER ALLERGIC TRIGGER: ICD-10-CM

## 2025-05-12 PROCEDURE — 99214 OFFICE O/P EST MOD 30 MIN: CPT | Performed by: FAMILY MEDICINE

## 2025-05-12 PROCEDURE — 1126F AMNT PAIN NOTED NONE PRSNT: CPT | Performed by: FAMILY MEDICINE

## 2025-05-12 PROCEDURE — 1160F RVW MEDS BY RX/DR IN RCRD: CPT | Performed by: FAMILY MEDICINE

## 2025-05-12 PROCEDURE — 1159F MED LIST DOCD IN RCRD: CPT | Performed by: FAMILY MEDICINE

## 2025-05-12 RX ORDER — MOMETASONE FUROATE MONOHYDRATE 50 UG/1
2 SPRAY, METERED NASAL DAILY
Qty: 3 EACH | Refills: 3 | Status: SHIPPED | OUTPATIENT
Start: 2025-05-12

## 2025-05-12 RX ORDER — OMEPRAZOLE 20 MG/1
40 CAPSULE, DELAYED RELEASE ORAL DAILY
Qty: 180 CAPSULE | Refills: 3 | Status: SHIPPED | OUTPATIENT
Start: 2025-05-12

## 2025-05-12 RX ORDER — PAROXETINE 40 MG/1
40 TABLET, FILM COATED ORAL DAILY
Qty: 90 TABLET | Refills: 3 | Status: SHIPPED | OUTPATIENT
Start: 2025-05-12 | End: 2025-05-15 | Stop reason: SDUPTHER

## 2025-05-12 NOTE — PROGRESS NOTES
Follow Up Office Visit      Date of Visit:  2025   Patient Name: Toribio Fishman  : 1975   MRN: [id number]     Chief Complaint:    Chief Complaint   Patient presents with    Med Refill       History of Present Illness: Toribio Fishman is a 49 y.o. male who is here today for follow up.    History of Present Illness  The patient presents for evaluation of a skin lesion, anxiety, weight gain, and nasal congestion.    He reports the development of a scab on his skin, which has subsequently darkened. He has not attempted to remove the scab.    He has been experiencing shortness of breath, which he attributes to recent weight gain and anxiety. He acknowledges a lack of physical activity but has recently initiated a walking regimen with his daughter. He also admits to late-night eating habits. He has a gym membership but does not use it. He is currently on Paxil 30 mg and is considering increasing the dosage to 40 mg, a level he was previously on several years ago. He plans to gradually discontinue all medications once his current issues are resolved. He believes his shortness of breath is related to anxiety and weight gain.    He reports feeling congested and is uncertain about the availability of refills for his nasal spray. He suspects that some of his other medications may be contributing to this symptom.    He is currently on Cialis, which he finds effective, but notes that it occasionally causes dizziness.      Subjective      Review of Systems:   Review of Systems    Past Medical History:   Past Medical History:   Diagnosis Date    Cardiomyopathy     MILD   2005    Intermittent palpitations     Obsessive compulsive disorder        Past Surgical History:   Past Surgical History:   Procedure Laterality Date    CARDIAC CATHETERIZATION  2005       Family History:   Family History   Problem Relation Age of Onset    Hypertension Father     Coronary artery disease Other     Diabetes Other   "      Social History:   Social History     Socioeconomic History    Marital status:    Tobacco Use    Smoking status: Never     Passive exposure: Never    Smokeless tobacco: Never   Vaping Use    Vaping status: Never Used   Substance and Sexual Activity    Alcohol use: Not Currently     Alcohol/week: 12.0 standard drinks of alcohol    Drug use: Never    Sexual activity: Not Currently     Partners: Female       Medications:     Current Outpatient Medications:     clindamycin (Cleocin) 300 MG capsule, Take 1 capsule by mouth 3 (Three) Times a Day., Disp: 20 capsule, Rfl: 0    mometasone (Nasonex) 50 MCG/ACT nasal spray, Administer 2 sprays into the nostril(s) as directed by provider Daily., Disp: 3 each, Rfl: 3    omeprazole (priLOSEC) 20 MG capsule, Take 2 capsules by mouth Daily., Disp: 180 capsule, Rfl: 3    PARoxetine (PAXIL) 40 MG tablet, Take 1 tablet by mouth Daily., Disp: 90 tablet, Rfl: 3    sildenafil (REVATIO) 20 MG tablet, Take 1 tablet by mouth 3 (Three) Times a Day., Disp: 90 tablet, Rfl: 2    tadalafil (CIALIS) 10 MG tablet, TAKE 1 TABLET BY MOUTH DAILY AS NEEDED FOR ERECTILE DYSFUNCTION, Disp: 30 tablet, Rfl: 5    TiZANidine (ZANAFLEX) 4 MG capsule, TAKE 1 CAPSULE BY MOUTH 3 TIMES A DAY, Disp: 30 capsule, Rfl: 0    Allergies:   No Known Allergies    Objective     Physical Exam:  Vital Signs:   Vitals:    05/12/25 1341   BP: 130/86   Pulse: 75   SpO2: 97%   Weight: 123 kg (271 lb)   Height: 182.9 cm (72\")   PainSc: 0-No pain     Body mass index is 36.75 kg/m².     Physical Exam  Vitals and nursing note reviewed.   Constitutional:       Appearance: Normal appearance. He is obese.   HENT:      Head: Normocephalic.      Right Ear: External ear normal.      Left Ear: External ear normal.      Nose: Nose normal.   Eyes:      Pupils: Pupils are equal, round, and reactive to light.   Musculoskeletal:      Cervical back: Normal range of motion and neck supple.   Skin:     General: Skin is warm and dry. "             Comments: An sk inflammed that separates.   Neurological:      Mental Status: He is alert.   Psychiatric:         Mood and Affect: Mood normal.         Behavior: Behavior normal.       Physical Exam  Respiratory: Clear to auscultation, no wheezing, rales or rhonchi  Skin: Lesion noted, treated with cryotherapy    Results      Cryotherapy, Skin Lesion    Date/Time: 5/12/2025 5:07 PM    Performed by: Daniele Wu MD  Authorized by: Daniele Wu MD  Local anesthesia used: no    Anesthesia:  Local anesthesia used: no    Sedation:  Patient sedated: no    Patient tolerance: patient tolerated the procedure well with no immediate complications            Assessment / Plan      Assessment/Plan:   Diagnoses and all orders for this visit:    1. Inflamed seborrheic keratosis (Primary)    2. Recurrent major depressive disorder, in partial remission  -     PARoxetine (PAXIL) 40 MG tablet; Take 1 tablet by mouth Daily.  Dispense: 90 tablet; Refill: 3    3. Non-seasonal allergic rhinitis due to other allergic trigger  -     mometasone (Nasonex) 50 MCG/ACT nasal spray; Administer 2 sprays into the nostril(s) as directed by provider Daily.  Dispense: 3 each; Refill: 3    4. Gastroesophageal reflux disease with esophagitis without hemorrhage  -     omeprazole (priLOSEC) 20 MG capsule; Take 2 capsules by mouth Daily.  Dispense: 180 capsule; Refill: 3    Other orders  -     Cryotherapy, Skin Lesion       Assessment & Plan  1. Skin lesion.  - A cryotherapy procedure was performed today to treat the lesion.  - The lesion is expected to fall off within 2 weeks and will leave a smooth scar.  - The patient was advised not to pick at the lesion.  - Follow-up in 2 weeks to assess healing and ensure no complications.    2. Anxiety.  - Reports increased anxiety, possibly related to his recent divorce and the anniversary of his mother's passing.  - Currently on Paxil 30 mg and will increase the dose to 40 mg.  - Plans to wean  off the medication after his current stressors have resolved.>1 yr  - Counseling provided on coping mechanisms and the importance of mental health support.    3. Weight gain.  - Has gained significant weight, currently at 270 pounds.  - Advised to engage in intermittent fasting, specifically abstaining from food intake between breakfast and dinner.  - Encouraged to incorporate physical activity into his daily routine, such as walking after dinner.  - Discussed the importance of dietary changes and regular exercise for weight management.    4. Nasal congestion.  - Reports feeling clogged up, possibly due to his current medications.  - Prescription for a nasal spray will be provided.  - Reviewed potential side effects of current medications contributing to nasal congestion.  - Advised to monitor symptoms and follow up if congestion persists or worsens.    5. Medication management.  - Currently taking Cialis and reports it works well but causes some lightheadedness.  - Will continue using Cialis as needed.  - Discussed potential side effects and management strategies for lightheadedness.  - Prescription Drug Monitoring Program was reviewed.    PROCEDURE  Procedure: Cryotherapy to treat lesion    All questions were answered and agreement to proceed was given after the following Pre-Procedure details were reviewed:  - Risks and Benefits: Discussed the potential for temporary discomfort, redness, and swelling. Benefits include removal of the lesion.  - Side effects: Discussed potential side effects including pain, blistering, and scarring.  - Consent: Verbal consent obtained.    Intra-Procedure:  - Site Preparation: Cleaned the area around the lesion.    Post-Procedure:  - Tolerance Level: Patient tolerated the procedure well.  - Home Care Instructions: Advised to keep the area clean and dry, avoid picking at the site, and monitor for signs of infection. Informed that the lesion may take up to 2 weeks to fall off and heal  completely.    Follow Up:   Return in about 3 months (around 8/12/2025).    Patient or patient representative verbalized consent for the use of Ambient Listening during the visit with  Daniele Wu MD for chart documentation. 5/12/2025  17:11 EDT     @[facility] [place]   Answers submitted by the patient for this visit:  Problem not listed (Submitted on 5/8/2025)  Chief Complaint: Other medical problem  Reason for appointment: Medications  Onset: 1 to 6 months

## 2025-05-15 DIAGNOSIS — F33.41 RECURRENT MAJOR DEPRESSIVE DISORDER, IN PARTIAL REMISSION: ICD-10-CM

## 2025-05-15 RX ORDER — PAROXETINE 30 MG/1
30 TABLET, FILM COATED ORAL DAILY
Qty: 90 TABLET | Refills: 3 | OUTPATIENT
Start: 2025-05-15

## 2025-05-15 RX ORDER — PAROXETINE 40 MG/1
40 TABLET, FILM COATED ORAL DAILY
Qty: 90 TABLET | Refills: 3 | Status: SHIPPED | OUTPATIENT
Start: 2025-05-15

## 2025-06-02 ENCOUNTER — TELEPHONE (OUTPATIENT)
Dept: FAMILY MEDICINE CLINIC | Facility: CLINIC | Age: 50
End: 2025-06-02
Payer: MEDICAID

## 2025-06-02 NOTE — TELEPHONE ENCOUNTER
"Caller: Toribio Fishman \"RANDALL\"    Relationship: Self    Best call back number: [phone]     What orders are you requesting (i.e. lab or imaging): LAB WORK    In what timeframe would the patient need to come in: ASAP    Where will you receive your lab/imaging services: [facility]    Additional notes: PATIENT CALLED TO ASK IF DR FRAGA WAS WANTING TO HAVE HIM COMPLETE ANY LABS THIS YEAR    PATIENT WAS ANTICIPATING SOME LAB WORK FROM DR FRAGA AND WAS JUST CONFIRMING IF THAT WAS THE CASE    PLEASE ADVISE    "

## 2025-06-04 NOTE — TELEPHONE ENCOUNTER
Pt contacted.  Pt not due til 12/2025   Normal rate, regular rhythm.  Heart sounds S1, S2.  No murmurs, rubs or gallops.

## 2025-06-13 RX ORDER — TIZANIDINE HYDROCHLORIDE 4 MG/1
4 CAPSULE, GELATIN COATED ORAL 3 TIMES DAILY
Qty: 30 CAPSULE | Refills: 0 | Status: SHIPPED | OUTPATIENT
Start: 2025-06-13

## 2025-06-19 ENCOUNTER — OFFICE VISIT (OUTPATIENT)
Dept: FAMILY MEDICINE CLINIC | Facility: CLINIC | Age: 50
End: 2025-06-19
Payer: MEDICAID

## 2025-06-19 VITALS
WEIGHT: 269 LBS | DIASTOLIC BLOOD PRESSURE: 88 MMHG | SYSTOLIC BLOOD PRESSURE: 140 MMHG | OXYGEN SATURATION: 97 % | HEART RATE: 75 BPM | BODY MASS INDEX: 36.44 KG/M2 | HEIGHT: 72 IN

## 2025-06-19 DIAGNOSIS — M17.11 PRIMARY OSTEOARTHRITIS OF RIGHT KNEE: Primary | ICD-10-CM

## 2025-06-19 PROCEDURE — 1159F MED LIST DOCD IN RCRD: CPT | Performed by: FAMILY MEDICINE

## 2025-06-19 PROCEDURE — 99214 OFFICE O/P EST MOD 30 MIN: CPT | Performed by: FAMILY MEDICINE

## 2025-06-19 PROCEDURE — 1160F RVW MEDS BY RX/DR IN RCRD: CPT | Performed by: FAMILY MEDICINE

## 2025-06-19 PROCEDURE — 1125F AMNT PAIN NOTED PAIN PRSNT: CPT | Performed by: FAMILY MEDICINE

## 2025-06-19 RX ORDER — DICLOFENAC SODIUM 75 MG/1
75 TABLET, DELAYED RELEASE ORAL 2 TIMES DAILY
Qty: 60 TABLET | Refills: 1 | Status: SHIPPED | OUTPATIENT
Start: 2025-06-19

## 2025-06-19 RX ORDER — BUPROPION HYDROCHLORIDE 150 MG/1
150 TABLET ORAL DAILY
Qty: 30 TABLET | Refills: 3 | Status: SHIPPED | OUTPATIENT
Start: 2025-06-19

## 2025-06-19 NOTE — PROGRESS NOTES
Follow Up Office Visit      Date of Visit:  2025   Patient Name: Toribio Fishman  : 1975   MRN: [id number]     Chief Complaint:    Chief Complaint   Patient presents with    Knee Pain     And swelling  X3 days.  Missed 2 days of work       History of Present Illness: Toribio Fishman is a 49 y.o. male who is here today for follow up.    History of Present Illness  The patient presents for evaluation of knee pain, anxiety, and GERD.    He reports experiencing knee pain, which he attributes to his age and higher body weight. His occupation at [employer] involves prolonged standing and frequent stepping, contributing to the discomfort. The pain is localized on the inside of the knee and extends upwards. He recalls an incident where he hit a box with his foot sideways, resulting in pain. The pain was worse two days ago and the knee was swollen. He has been attempting weight loss through walking with his daughter but acknowledges overuse of his knee. He has not been prescribed any arthritis medication but takes ibuprofen for other conditions. He has previously undergone a knee arthroscopy approximately 3 to 5 years ago, which has not caused him any issues since. He was previously prescribed anti-inflammatory medication but was not given any analgesics. He was previously prescribed ibuprofen 800 mg following a cyst removal, which he found beneficial.    He also mentions a decrease in muscle mass and a reduction in his ability to jump. He has lost some weight but admits to occasional indulgences such as eating pizza.   He finds exercise beneficial and enjoys his job as it allows him to sweat and exercise.    He is currently taking Paxil for social anxiety and expresses a desire to discontinue its use.    He occasionally experiences GERD and takes omeprazole as needed.    PAST SURGICAL HISTORY:  Knee arthroscopy approximately 3 to 5 years ago  Cyst removal    SOCIAL HISTORY  He works at [employer]. He has 2 children  aged 18 and 16.      Subjective      Review of Systems:   Review of Systems    Past Medical History:   Past Medical History:   Diagnosis Date    Cardiomyopathy     MILD   2005    Intermittent palpitations     Obsessive compulsive disorder        Past Surgical History:   Past Surgical History:   Procedure Laterality Date    CARDIAC CATHETERIZATION  11/2005       Family History:   Family History   Problem Relation Age of Onset    Hypertension Father     Coronary artery disease Other     Diabetes Other        Social History:   Social History     Socioeconomic History    Marital status:    Tobacco Use    Smoking status: Never     Passive exposure: Never    Smokeless tobacco: Never   Vaping Use    Vaping status: Never Used   Substance and Sexual Activity    Alcohol use: Not Currently     Alcohol/week: 12.0 standard drinks of alcohol    Drug use: Never    Sexual activity: Not Currently     Partners: Female       Medications:     Current Outpatient Medications:     mometasone (Nasonex) 50 MCG/ACT nasal spray, Administer 2 sprays into the nostril(s) as directed by provider Daily., Disp: 3 each, Rfl: 3    omeprazole (priLOSEC) 20 MG capsule, Take 2 capsules by mouth Daily., Disp: 180 capsule, Rfl: 3    PARoxetine (PAXIL) 40 MG tablet, Take 1 tablet by mouth Daily., Disp: 90 tablet, Rfl: 3    sildenafil (REVATIO) 20 MG tablet, Take 1 tablet by mouth 3 (Three) Times a Day., Disp: 90 tablet, Rfl: 2    tadalafil (CIALIS) 10 MG tablet, TAKE 1 TABLET BY MOUTH DAILY AS NEEDED FOR ERECTILE DYSFUNCTION, Disp: 30 tablet, Rfl: 5    TiZANidine (ZANAFLEX) 4 MG capsule, TAKE 1 CAPSULE BY MOUTH 3 TIMES A DAY, Disp: 30 capsule, Rfl: 0    clindamycin (Cleocin) 300 MG capsule, Take 1 capsule by mouth 3 (Three) Times a Day. (Patient not taking: Reported on 6/19/2025), Disp: 20 capsule, Rfl: 0    Allergies:   No Known Allergies    Objective     Physical Exam:  Vital Signs:   Vitals:    06/19/25 1456   BP: 140/88   Pulse: 75   SpO2: 97%  "  Weight: 122 kg (269 lb)   Height: 182.9 cm (72\")   PainSc: 6    PainLoc: Knee     Body mass index is 36.48 kg/m².     Physical Exam  Musculoskeletal:        Legs:       Comments: Knee intact, valgus have lcl 1+ tender / ant /post drawer neg.       Physical Exam  Musculoskeletal: MCL and ACL are intact.  Other: Weight is 269 pounds.    Results      Procedures      Assessment / Plan      Assessment/Plan:   Diagnoses and all orders for this visit:    1. Primary osteoarthritis of right knee (Primary)     2. depression  Assessment & Plan  1. Knee pain.  - The patient's knee pain is likely exacerbated by his weight and occupational activities. He reports swelling and pain, particularly when stepping or hitting objects.  - Increased pain and swelling noted, especially with physical activity.  - Advised to apply ice packs to the affected area and to take a break from work for 2 days to rest the knee.  - Prescription for diclofenac provided to manage inflammation and pain. Further imaging such as x-rays will be considered if symptoms persist.    2. Anxiety.  - Currently on Paxil for anxiety and wishes to wean off it.  - Advised to gradually reduce Paxil dosage by 20 mg per week until complete discontinuation. 20 mg x 7d, 10 mg x 7d. Get off.  - Concurrently, will start the second week,Wellbutrin  mg daily, which may aid in weight loss and improve mood.  - Blood work to be conducted in 2 months to monitor the effects of the new medication.    3. Gastroesophageal reflux disease (GERD).  - Experiences occasional GERD symptoms and currently takes omeprazole as needed.  - Advised to continue this regimen and monitor for any changes in symptoms.  - No new symptoms reported, current management appears effective.  - Continue monitoring and adjust treatment if necessary.    4. Weight management.  - Counseled on the importance of weight loss to alleviate knee pain and improve overall health.  - Encouraged to continue walking " for exercise and to monitor diet, avoiding high-calorie foods like pizza.  - Discussed potential weight loss benefits of Wellbutrin XL.  - Advised to aim for gradual weight loss to reduce strain on knees and improve overall well-being.    Follow Up:   Return in about 7 weeks (around 8/7/2025).    Patient or patient representative verbalized consent for the use of Ambient Listening during the visit with  Daniele Wu MD for chart documentation. 6/19/2025  15:46 EDT     @[facility] [place]

## 2025-07-03 ENCOUNTER — TELEPHONE (OUTPATIENT)
Dept: FAMILY MEDICINE CLINIC | Facility: CLINIC | Age: 50
End: 2025-07-03
Payer: MEDICAID

## 2025-07-03 NOTE — TELEPHONE ENCOUNTER
Patient called the office wanting to be seen for knee pain, pt mentioned he twisted his knee at work. I asked the pt if the visit was for workers comp, he stated he was unsure. I advised patient to contact  or his supervisor to be for sure if its worker comp or not so we would be able to bill the correct insurance. He stated he would contact them and give us a call back as soon as he knows something from . I also advised the patient if the pain worsens to go to the emergency room based off the holiday weekend. Patient voiced understanding.

## 2025-07-03 NOTE — TELEPHONE ENCOUNTER
"  Caller: Toribio Fishman \"RANDALL\"    Relationship: Self    Best call back number: [phone]     What orders are you requesting (i.e. lab or imaging): ORDER FOR A RIGHT KNEE BRACE     In what timeframe would the patient need to come in: AS SOON AS POSSIBLE     Additional notes: PATIENT WOULD LIKE FOR A ORDER TO BE PLACED TO GET A RIGHT KNEE BRACE AS SOON AS POSSIBLE   "

## 2025-07-07 NOTE — TELEPHONE ENCOUNTER
"  Caller: Toribio Fishman \"RANDALL\"    Relationship: Self    Best call back number: [phone]     What was the call regarding: CHECKING ON STATUS OF KNEE BRACE   "

## 2025-07-07 NOTE — TELEPHONE ENCOUNTER
Dr frances wants ortho to order.  Pt was in clinic dr frances wrote order for him due to pt needing it before going on vacation

## 2025-08-19 ENCOUNTER — TELEPHONE (OUTPATIENT)
Dept: FAMILY MEDICINE CLINIC | Facility: CLINIC | Age: 50
End: 2025-08-19
Payer: MEDICAID

## 2025-08-20 DIAGNOSIS — Z79.899 HIGH RISK MEDICATION USE: Primary | ICD-10-CM
